# Patient Record
Sex: FEMALE | Race: WHITE | NOT HISPANIC OR LATINO | Employment: UNEMPLOYED | URBAN - NONMETROPOLITAN AREA
[De-identification: names, ages, dates, MRNs, and addresses within clinical notes are randomized per-mention and may not be internally consistent; named-entity substitution may affect disease eponyms.]

---

## 2023-03-27 DIAGNOSIS — E03.9 HYPOTHYROIDISM, UNSPECIFIED TYPE: ICD-10-CM

## 2023-03-27 DIAGNOSIS — E78.5 HYPERLIPIDEMIA, UNSPECIFIED HYPERLIPIDEMIA TYPE: ICD-10-CM

## 2023-03-27 DIAGNOSIS — I10 BENIGN ESSENTIAL HYPERTENSION: ICD-10-CM

## 2023-03-27 RX ORDER — AMLODIPINE BESYLATE 5 MG/1
1 TABLET ORAL DAILY
COMMUNITY
Start: 2019-04-02 | End: 2023-03-27 | Stop reason: SDUPTHER

## 2023-03-27 RX ORDER — AMLODIPINE BESYLATE 5 MG/1
5 TABLET ORAL DAILY
Qty: 90 TABLET | Refills: 1 | Status: SHIPPED | OUTPATIENT
Start: 2023-03-27 | End: 2023-05-01 | Stop reason: SDUPTHER

## 2023-03-27 RX ORDER — LOSARTAN POTASSIUM 100 MG/1
100 TABLET ORAL DAILY
Qty: 90 TABLET | Refills: 1 | Status: SHIPPED | OUTPATIENT
Start: 2023-03-27 | End: 2023-05-01 | Stop reason: SDUPTHER

## 2023-03-27 RX ORDER — PRAVASTATIN SODIUM 40 MG/1
40 TABLET ORAL DAILY
Qty: 90 TABLET | Refills: 1 | Status: SHIPPED | OUTPATIENT
Start: 2023-03-27 | End: 2023-05-01 | Stop reason: SDUPTHER

## 2023-03-27 RX ORDER — LEVOTHYROXINE SODIUM 25 UG/1
25 TABLET ORAL
COMMUNITY
Start: 2021-01-11 | End: 2023-03-27 | Stop reason: SDUPTHER

## 2023-03-27 RX ORDER — LEVOTHYROXINE SODIUM 25 UG/1
25 TABLET ORAL
Qty: 90 TABLET | Refills: 1 | Status: SHIPPED | OUTPATIENT
Start: 2023-03-27 | End: 2023-05-01 | Stop reason: SDUPTHER

## 2023-03-27 RX ORDER — LOSARTAN POTASSIUM 100 MG/1
1 TABLET ORAL DAILY
COMMUNITY
Start: 2019-12-23 | End: 2023-03-27 | Stop reason: SDUPTHER

## 2023-03-27 RX ORDER — PRAVASTATIN SODIUM 40 MG/1
1 TABLET ORAL DAILY
COMMUNITY
Start: 2014-09-26 | End: 2023-03-27 | Stop reason: SDUPTHER

## 2023-04-27 PROBLEM — N39.0 ACUTE LOWER URINARY TRACT INFECTION: Status: ACTIVE | Noted: 2023-04-27

## 2023-04-27 PROBLEM — R73.9 ELEVATED BLOOD SUGAR: Status: ACTIVE | Noted: 2023-04-27

## 2023-04-27 PROBLEM — R31.21 ASYMPTOMATIC MICROSCOPIC HEMATURIA: Status: ACTIVE | Noted: 2023-04-27

## 2023-04-27 PROBLEM — M89.9 BONE LESION: Status: ACTIVE | Noted: 2023-04-27

## 2023-04-27 PROBLEM — E55.9 VITAMIN D DEFICIENCY: Status: ACTIVE | Noted: 2023-04-27

## 2023-04-27 PROBLEM — F32.0 CURRENT MILD EPISODE OF MAJOR DEPRESSIVE DISORDER (CMS-HCC): Status: ACTIVE | Noted: 2023-04-27

## 2023-04-27 PROBLEM — M85.80 OSTEOPENIA: Status: ACTIVE | Noted: 2023-04-27

## 2023-04-27 PROBLEM — R45.86 MOOD SWINGS: Status: ACTIVE | Noted: 2023-04-27

## 2023-04-27 PROBLEM — M54.9 CHRONIC BACK PAIN: Status: ACTIVE | Noted: 2023-04-27

## 2023-04-27 PROBLEM — R80.9 PROTEINURIA: Status: ACTIVE | Noted: 2023-04-27

## 2023-04-27 PROBLEM — R20.0 NUMBNESS: Status: ACTIVE | Noted: 2023-04-27

## 2023-04-27 PROBLEM — L98.9 SKIN LESION: Status: ACTIVE | Noted: 2023-04-27

## 2023-04-27 PROBLEM — M79.605 PAIN OF LEFT LOWER EXTREMITY: Status: ACTIVE | Noted: 2023-04-27

## 2023-04-27 PROBLEM — C44.91 BASAL CELL CARCINOMA: Status: ACTIVE | Noted: 2023-04-27

## 2023-04-27 PROBLEM — M79.604 PAIN OF RIGHT LOWER EXTREMITY: Status: ACTIVE | Noted: 2023-04-27

## 2023-04-27 PROBLEM — R39.9 UTI SYMPTOMS: Status: ACTIVE | Noted: 2023-04-27

## 2023-04-27 PROBLEM — G89.29 CHRONIC BACK PAIN: Status: ACTIVE | Noted: 2023-04-27

## 2023-04-27 PROBLEM — M19.90 ARTHRITIS: Status: ACTIVE | Noted: 2023-04-27

## 2023-04-27 RX ORDER — HYDROCHLOROTHIAZIDE 25 MG/1
TABLET ORAL
COMMUNITY
End: 2023-05-01 | Stop reason: SDUPTHER

## 2023-05-01 ENCOUNTER — OFFICE VISIT (OUTPATIENT)
Dept: PRIMARY CARE | Facility: CLINIC | Age: 78
End: 2023-05-01
Payer: MEDICARE

## 2023-05-01 VITALS
BODY MASS INDEX: 31.8 KG/M2 | DIASTOLIC BLOOD PRESSURE: 80 MMHG | HEIGHT: 63 IN | HEART RATE: 89 BPM | OXYGEN SATURATION: 93 % | SYSTOLIC BLOOD PRESSURE: 130 MMHG | TEMPERATURE: 97.8 F | WEIGHT: 179.5 LBS

## 2023-05-01 DIAGNOSIS — E03.9 HYPOTHYROIDISM, UNSPECIFIED TYPE: ICD-10-CM

## 2023-05-01 DIAGNOSIS — Z13.31 DEPRESSION SCREENING: ICD-10-CM

## 2023-05-01 DIAGNOSIS — E78.5 HYPERLIPIDEMIA, UNSPECIFIED HYPERLIPIDEMIA TYPE: ICD-10-CM

## 2023-05-01 DIAGNOSIS — E66.9 CLASS 1 OBESITY WITH SERIOUS COMORBIDITY AND BODY MASS INDEX (BMI) OF 32.0 TO 32.9 IN ADULT, UNSPECIFIED OBESITY TYPE: ICD-10-CM

## 2023-05-01 DIAGNOSIS — E55.9 VITAMIN D DEFICIENCY: ICD-10-CM

## 2023-05-01 DIAGNOSIS — Z00.00 ROUTINE GENERAL MEDICAL EXAMINATION AT HEALTH CARE FACILITY: Primary | ICD-10-CM

## 2023-05-01 DIAGNOSIS — I10 BENIGN ESSENTIAL HYPERTENSION: ICD-10-CM

## 2023-05-01 PROBLEM — E66.811 CLASS 1 OBESITY WITH SERIOUS COMORBIDITY AND BODY MASS INDEX (BMI) OF 32.0 TO 32.9 IN ADULT: Status: ACTIVE | Noted: 2023-05-01

## 2023-05-01 PROCEDURE — G0447 BEHAVIOR COUNSEL OBESITY 15M: HCPCS | Performed by: NURSE PRACTITIONER

## 2023-05-01 PROCEDURE — 1170F FXNL STATUS ASSESSED: CPT | Performed by: NURSE PRACTITIONER

## 2023-05-01 PROCEDURE — 3075F SYST BP GE 130 - 139MM HG: CPT | Performed by: NURSE PRACTITIONER

## 2023-05-01 PROCEDURE — 99214 OFFICE O/P EST MOD 30 MIN: CPT | Performed by: NURSE PRACTITIONER

## 2023-05-01 PROCEDURE — 1036F TOBACCO NON-USER: CPT | Performed by: NURSE PRACTITIONER

## 2023-05-01 PROCEDURE — 1159F MED LIST DOCD IN RCRD: CPT | Performed by: NURSE PRACTITIONER

## 2023-05-01 PROCEDURE — 1160F RVW MEDS BY RX/DR IN RCRD: CPT | Performed by: NURSE PRACTITIONER

## 2023-05-01 PROCEDURE — 3079F DIAST BP 80-89 MM HG: CPT | Performed by: NURSE PRACTITIONER

## 2023-05-01 PROCEDURE — 1123F ACP DISCUSS/DSCN MKR DOCD: CPT | Performed by: NURSE PRACTITIONER

## 2023-05-01 PROCEDURE — G0439 PPPS, SUBSEQ VISIT: HCPCS | Performed by: NURSE PRACTITIONER

## 2023-05-01 PROCEDURE — G0444 DEPRESSION SCREEN ANNUAL: HCPCS | Performed by: NURSE PRACTITIONER

## 2023-05-01 RX ORDER — HYDROCHLOROTHIAZIDE 25 MG/1
TABLET ORAL
Qty: 90 TABLET | Refills: 0 | Status: SHIPPED | OUTPATIENT
Start: 2023-05-01 | End: 2023-11-01 | Stop reason: ALTCHOICE

## 2023-05-01 RX ORDER — PRAVASTATIN SODIUM 40 MG/1
40 TABLET ORAL DAILY
Qty: 90 TABLET | Refills: 0 | Status: SHIPPED | OUTPATIENT
Start: 2023-05-01 | End: 2023-09-21

## 2023-05-01 RX ORDER — AMLODIPINE BESYLATE 5 MG/1
5 TABLET ORAL DAILY
Qty: 90 TABLET | Refills: 0 | Status: SHIPPED | OUTPATIENT
Start: 2023-05-01 | End: 2023-09-21

## 2023-05-01 RX ORDER — LOSARTAN POTASSIUM 100 MG/1
100 TABLET ORAL DAILY
Qty: 90 TABLET | Refills: 0 | Status: SHIPPED | OUTPATIENT
Start: 2023-05-01 | End: 2023-09-21

## 2023-05-01 RX ORDER — LEVOTHYROXINE SODIUM 25 UG/1
25 TABLET ORAL
Qty: 90 TABLET | Refills: 0 | Status: SHIPPED | OUTPATIENT
Start: 2023-05-01 | End: 2023-09-21

## 2023-05-01 ASSESSMENT — ENCOUNTER SYMPTOMS
ALLERGIC/IMMUNOLOGIC NEGATIVE: 1
WHEEZING: 0
LOSS OF SENSATION IN FEET: 1
OCCASIONAL FEELINGS OF UNSTEADINESS: 1
ARTHRALGIAS: 1
ABDOMINAL PAIN: 0
SINUS PAIN: 0
SHORTNESS OF BREATH: 0
SINUS PRESSURE: 0
RHINORRHEA: 0
HYPERTENSION: 1
FEVER: 0
HEMATOLOGIC/LYMPHATIC NEGATIVE: 1
VOMITING: 0
DIZZINESS: 0
COUGH: 0
ABDOMINAL DISTENTION: 0
DIARRHEA: 0
LIGHT-HEADEDNESS: 0
FATIGUE: 0
ACTIVITY CHANGE: 0
DEPRESSION: 0
NAUSEA: 0
PSYCHIATRIC NEGATIVE: 1
ENDOCRINE NEGATIVE: 1
BACK PAIN: 1
EYES NEGATIVE: 1
SORE THROAT: 0
HEADACHES: 0
CHILLS: 0
CHEST TIGHTNESS: 0
PALPITATIONS: 0
CONSTIPATION: 0
NUMBNESS: 0
WEAKNESS: 0

## 2023-05-01 ASSESSMENT — ACTIVITIES OF DAILY LIVING (ADL)
DOING_HOUSEWORK: INDEPENDENT
GROCERY_SHOPPING: INDEPENDENT
MANAGING_FINANCES: INDEPENDENT
DRESSING: INDEPENDENT
BATHING: INDEPENDENT
TAKING_MEDICATION: INDEPENDENT

## 2023-05-01 ASSESSMENT — PATIENT HEALTH QUESTIONNAIRE - PHQ9
2. FEELING DOWN, DEPRESSED OR HOPELESS: NOT AT ALL
SUM OF ALL RESPONSES TO PHQ9 QUESTIONS 1 AND 2: 0
1. LITTLE INTEREST OR PLEASURE IN DOING THINGS: NOT AT ALL

## 2023-05-01 NOTE — PROGRESS NOTES
Subjective   Reason for Visit: Linda Van is an 78 y.o. female here for a Medicare Wellness visit.          Reviewed all medications by prescribing practitioner or clinical pharmacist (such as prescriptions, OTCs, herbal therapies and supplements) and documented in the medical record.    Medicare physical  Hypothyroidism, TSH 2.07, taking levothyroxine 25 mcg daily  Urology, follows with Dr. Ambriz for incontinence  Chronic low back pain finished PT, continues to do exercises at home  R hip arthritis, worsening, discussed referral to ortho, she will go back and see Dr. Sanchez who she has seen in the past.  Recommend pneumonia vaccine and Tdap  I spent greater than 15 minutes face-to-face with individual providing recommendations for nutrition choices and exercise plan to help achieve weight reduction.  I spent 15 minutes obtaining and discussing depression screening using PHQ-2 questions with results documented in the chart.    Preventive:  CRC screen: Declines  Mammogram: 6/2019, declines  DEXA scan: 6/2019, declines  PAP:  Flu vaccine: Declines  COVID vaccine: 2021  Shingrix: 2019/2020  PCV13:  PCV15:  PCV20:  PPSV23:   Tdap: Unknown  CT cardiac scoring:  Lung cancer screening:     Hypertension  This is a chronic problem. The problem has been gradually improving since onset. The problem is controlled. Pertinent negatives include no chest pain, headaches, palpitations or shortness of breath. Agents associated with hypertension include NSAIDs. Risk factors for coronary artery disease include dyslipidemia, obesity and sedentary lifestyle. Past treatments include angiotensin blockers, calcium channel blockers and diuretics. The current treatment provides moderate improvement.   Hyperlipidemia  This is a chronic problem. The problem is controlled. Recent lipid tests were reviewed and are normal. Exacerbating diseases include obesity. Pertinent negatives include no chest pain or shortness of breath. Current  "antihyperlipidemic treatment includes statins. The current treatment provides moderate improvement of lipids. Risk factors for coronary artery disease include dyslipidemia, obesity, hypertension and a sedentary lifestyle.       Patient Care Team:  BRENDON Wagner-CNP, DNP as PCP - General     Review of Systems   Constitutional:  Negative for activity change, chills, fatigue and fever.   HENT:  Negative for congestion, rhinorrhea, sinus pressure, sinus pain and sore throat.    Eyes: Negative.    Respiratory:  Negative for cough, chest tightness, shortness of breath and wheezing.    Cardiovascular:  Negative for chest pain, palpitations and leg swelling.   Gastrointestinal:  Negative for abdominal distention, abdominal pain, constipation, diarrhea, nausea and vomiting.   Endocrine: Negative.    Genitourinary: Negative.    Musculoskeletal:  Positive for arthralgias and back pain.   Skin: Negative.    Allergic/Immunologic: Negative.    Neurological:  Negative for dizziness, syncope, weakness, light-headedness, numbness and headaches.   Hematological: Negative.    Psychiatric/Behavioral: Negative.     All other systems reviewed and are negative.      Objective   Vitals:  /80   Pulse 89   Temp 36.6 °C (97.8 °F)   Ht 1.588 m (5' 2.5\")   Wt 81.4 kg (179 lb 8 oz)   SpO2 93%   BMI 32.31 kg/m²       Physical Exam  Vitals and nursing note reviewed.   Constitutional:       General: She is not in acute distress.     Appearance: Normal appearance. She is obese. She is not ill-appearing.   HENT:      Head: Normocephalic and atraumatic.      Right Ear: Tympanic membrane, ear canal and external ear normal.      Left Ear: Tympanic membrane, ear canal and external ear normal.      Nose: Nose normal.      Mouth/Throat:      Mouth: Mucous membranes are moist.      Pharynx: Oropharynx is clear.   Eyes:      Pupils: Pupils are equal, round, and reactive to light.   Cardiovascular:      Rate and Rhythm: Normal rate and " regular rhythm.      Pulses: Normal pulses.      Heart sounds: Normal heart sounds. No murmur heard.  Pulmonary:      Effort: Pulmonary effort is normal. No respiratory distress.      Breath sounds: Normal breath sounds. No wheezing.   Abdominal:      General: Bowel sounds are normal. There is no distension.      Palpations: Abdomen is soft.      Tenderness: There is no abdominal tenderness.   Musculoskeletal:         General: No tenderness. Normal range of motion.      Cervical back: Normal range of motion and neck supple.      Right lower leg: No edema.      Left lower leg: No edema.   Skin:     General: Skin is warm and dry.      Capillary Refill: Capillary refill takes less than 2 seconds.      Coloration: Skin is not jaundiced.   Neurological:      General: No focal deficit present.      Mental Status: She is alert and oriented to person, place, and time.      Motor: No weakness.   Psychiatric:         Mood and Affect: Mood normal.         Behavior: Behavior normal.         Thought Content: Thought content normal.         Judgment: Judgment normal.         Assessment/Plan   Problem List Items Addressed This Visit          Circulatory    Benign essential hypertension    Current Assessment & Plan     Controlled. Continue current medications.  -Low fat/cholesterol, low sodium, low carbohydrate diet  -Exercise as tolerated 150 minutes/week, increase activity slowly  -Weight loss         Relevant Medications    amLODIPine (Norvasc) 5 mg tablet    hydroCHLOROthiazide (HYDRODiuril) 25 mg tablet    losartan (Cozaar) 100 mg tablet    Other Relevant Orders    Follow Up In Primary Care    CBC and Auto Differential    Comprehensive Metabolic Panel       Endocrine/Metabolic    Hypothyroidism    Current Assessment & Plan     Controlled. Continue current medications.  Monitor TSH         Relevant Medications    levothyroxine (Synthroid, Levoxyl) 25 mcg tablet    Other Relevant Orders    Follow Up In Primary Care    TSH with  reflex to Free T4 if abnormal    Vitamin D deficiency    Relevant Orders    Vitamin D 1,25 Dihydroxy    Class 1 obesity with serious comorbidity and body mass index (BMI) of 32.0 to 32.9 in adult    Current Assessment & Plan     -Avoid sweets and sugary drinks  -Drink plenty of water  -Avoid processed foods and refined foods  -Eat fruits, vegetables, lean protein, whole grains  -Increase your activity level            Other    Hyperlipidemia    Current Assessment & Plan     Controlled. Continue current medications.  -Low fat/low cholesterol diet  -Eat more fresh foods  -Avoid processed/prepackaged/fast foods  -Gradually increase physical activity  -Weight loss         Relevant Medications    pravastatin (Pravachol) 40 mg tablet    Other Relevant Orders    Follow Up In Primary Care    Lipid Panel     Other Visit Diagnoses       Routine general medical examination at health care facility    -  Primary    Depression screening

## 2023-05-01 NOTE — ASSESSMENT & PLAN NOTE
Controlled. Continue current medications.  -Low fat/cholesterol, low sodium, low carbohydrate diet  -Exercise as tolerated 150 minutes/week, increase activity slowly  -Weight loss

## 2023-05-01 NOTE — ASSESSMENT & PLAN NOTE
-Avoid sweets and sugary drinks  -Drink plenty of water  -Avoid processed foods and refined foods  -Eat fruits, vegetables, lean protein, whole grains  -Increase your activity level   Yes - the patient is able to be screened

## 2023-05-01 NOTE — ASSESSMENT & PLAN NOTE
Controlled. Continue current medications.  -Low fat/low cholesterol diet  -Eat more fresh foods  -Avoid processed/prepackaged/fast foods  -Gradually increase physical activity  -Weight loss

## 2023-05-04 ENCOUNTER — LAB (OUTPATIENT)
Dept: LAB | Facility: LAB | Age: 78
End: 2023-05-04
Payer: MEDICARE

## 2023-05-04 DIAGNOSIS — E55.9 VITAMIN D DEFICIENCY: ICD-10-CM

## 2023-05-04 DIAGNOSIS — I10 BENIGN ESSENTIAL HYPERTENSION: ICD-10-CM

## 2023-05-04 DIAGNOSIS — E78.5 HYPERLIPIDEMIA, UNSPECIFIED HYPERLIPIDEMIA TYPE: ICD-10-CM

## 2023-05-04 DIAGNOSIS — E03.9 HYPOTHYROIDISM, UNSPECIFIED TYPE: ICD-10-CM

## 2023-05-04 LAB
ALANINE AMINOTRANSFERASE (SGPT) (U/L) IN SER/PLAS: 11 U/L (ref 7–45)
ALBUMIN (G/DL) IN SER/PLAS: 4.1 G/DL (ref 3.4–5)
ALKALINE PHOSPHATASE (U/L) IN SER/PLAS: 80 U/L (ref 33–136)
ANION GAP IN SER/PLAS: 14 MMOL/L (ref 10–20)
ASPARTATE AMINOTRANSFERASE (SGOT) (U/L) IN SER/PLAS: 15 U/L (ref 9–39)
BASOPHILS (10*3/UL) IN BLOOD BY AUTOMATED COUNT: 0.12 X10E9/L (ref 0–0.1)
BASOPHILS/100 LEUKOCYTES IN BLOOD BY AUTOMATED COUNT: 1.1 % (ref 0–2)
BILIRUBIN TOTAL (MG/DL) IN SER/PLAS: 0.9 MG/DL (ref 0–1.2)
CALCIUM (MG/DL) IN SER/PLAS: 9.4 MG/DL (ref 8.6–10.3)
CARBON DIOXIDE, TOTAL (MMOL/L) IN SER/PLAS: 30 MMOL/L (ref 21–32)
CHLORIDE (MMOL/L) IN SER/PLAS: 102 MMOL/L (ref 98–107)
CHOLESTEROL (MG/DL) IN SER/PLAS: 157 MG/DL (ref 0–199)
CHOLESTEROL IN HDL (MG/DL) IN SER/PLAS: 72 MG/DL
CHOLESTEROL/HDL RATIO: 2.2
CREATININE (MG/DL) IN SER/PLAS: 0.7 MG/DL (ref 0.5–1.05)
EOSINOPHILS (10*3/UL) IN BLOOD BY AUTOMATED COUNT: 0.27 X10E9/L (ref 0–0.4)
EOSINOPHILS/100 LEUKOCYTES IN BLOOD BY AUTOMATED COUNT: 2.4 % (ref 0–6)
ERYTHROCYTE DISTRIBUTION WIDTH (RATIO) BY AUTOMATED COUNT: 13.2 % (ref 11.5–14.5)
ERYTHROCYTE MEAN CORPUSCULAR HEMOGLOBIN CONCENTRATION (G/DL) BY AUTOMATED: 33.3 G/DL (ref 32–36)
ERYTHROCYTE MEAN CORPUSCULAR VOLUME (FL) BY AUTOMATED COUNT: 90 FL (ref 80–100)
ERYTHROCYTES (10*6/UL) IN BLOOD BY AUTOMATED COUNT: 4.65 X10E12/L (ref 4–5.2)
GFR FEMALE: 88 ML/MIN/1.73M2
GLUCOSE (MG/DL) IN SER/PLAS: 105 MG/DL (ref 74–99)
HEMATOCRIT (%) IN BLOOD BY AUTOMATED COUNT: 41.8 % (ref 36–46)
HEMOGLOBIN (G/DL) IN BLOOD: 13.9 G/DL (ref 12–16)
IMMATURE GRANULOCYTES/100 LEUKOCYTES IN BLOOD BY AUTOMATED COUNT: 0.5 % (ref 0–0.9)
LDL: 53 MG/DL (ref 0–99)
LEUKOCYTES (10*3/UL) IN BLOOD BY AUTOMATED COUNT: 11.4 X10E9/L (ref 4.4–11.3)
LYMPHOCYTES (10*3/UL) IN BLOOD BY AUTOMATED COUNT: 2.88 X10E9/L (ref 0.8–3)
LYMPHOCYTES/100 LEUKOCYTES IN BLOOD BY AUTOMATED COUNT: 25.4 % (ref 13–44)
MONOCYTES (10*3/UL) IN BLOOD BY AUTOMATED COUNT: 0.91 X10E9/L (ref 0.05–0.8)
MONOCYTES/100 LEUKOCYTES IN BLOOD BY AUTOMATED COUNT: 8 % (ref 2–10)
NEUTROPHILS (10*3/UL) IN BLOOD BY AUTOMATED COUNT: 7.12 X10E9/L (ref 1.6–5.5)
NEUTROPHILS/100 LEUKOCYTES IN BLOOD BY AUTOMATED COUNT: 62.6 % (ref 40–80)
PLATELETS (10*3/UL) IN BLOOD AUTOMATED COUNT: 303 X10E9/L (ref 150–450)
POTASSIUM (MMOL/L) IN SER/PLAS: 3.6 MMOL/L (ref 3.5–5.3)
PROTEIN TOTAL: 6.7 G/DL (ref 6.4–8.2)
SODIUM (MMOL/L) IN SER/PLAS: 142 MMOL/L (ref 136–145)
THYROTROPIN (MIU/L) IN SER/PLAS BY DETECTION LIMIT <= 0.05 MIU/L: 2.08 MIU/L (ref 0.44–3.98)
TRIGLYCERIDE (MG/DL) IN SER/PLAS: 158 MG/DL (ref 0–149)
UREA NITROGEN (MG/DL) IN SER/PLAS: 17 MG/DL (ref 6–23)
VLDL: 32 MG/DL (ref 0–40)

## 2023-05-04 PROCEDURE — 80053 COMPREHEN METABOLIC PANEL: CPT

## 2023-05-04 PROCEDURE — 85025 COMPLETE CBC W/AUTO DIFF WBC: CPT

## 2023-05-04 PROCEDURE — 82652 VIT D 1 25-DIHYDROXY: CPT

## 2023-05-04 PROCEDURE — 80061 LIPID PANEL: CPT

## 2023-05-04 PROCEDURE — 84443 ASSAY THYROID STIM HORMONE: CPT

## 2023-05-04 PROCEDURE — 36415 COLL VENOUS BLD VENIPUNCTURE: CPT

## 2023-05-08 LAB — VITAMIN D 1,25-DIHYDROXY: 42.8 PG/ML (ref 19.9–79.3)

## 2023-07-17 ENCOUNTER — TELEPHONE (OUTPATIENT)
Dept: PRIMARY CARE | Facility: CLINIC | Age: 78
End: 2023-07-17
Payer: MEDICARE

## 2023-07-17 ENCOUNTER — PATIENT OUTREACH (OUTPATIENT)
Dept: PRIMARY CARE | Facility: CLINIC | Age: 78
End: 2023-07-17
Payer: MEDICARE

## 2023-07-17 DIAGNOSIS — E87.6 HYPOKALEMIA: ICD-10-CM

## 2023-07-17 DIAGNOSIS — I48.0 PAROXYSMAL ATRIAL FIBRILLATION WITH RVR (MULTI): ICD-10-CM

## 2023-07-17 NOTE — TELEPHONE ENCOUNTER
Transition of Care    Inpatient facility: [facility]  Discharge diagnosis: A-Fib  Discharged to: Home  Discharge date: 07/14/2023  Initial Call date: 07/17/2023 care management tried calling twice, office called and spoke with patient  Spoke with patient/caregiver: patient   Left Message on date : na                                                                  Left Message on date : na  Do you need assistance  visits prior to your PCP visit: No  Home health care ordered: No  Have you been contacted by home care and have a start of care date: No  Are you taking medications as prescribed at discharge: Yes  If not taking medication as prescribed refer to [facility] Pharmacist: na  Referral to [facility] Pharmacist: No  Patient advised to bring all medications to PCP follow-up appointment.  Patient advised to follow discharge instructions until provider follow-up.  TCM visit date: 07/19/2023  TCM provider visit with: Екатерина Topete

## 2023-07-17 NOTE — PROGRESS NOTES
Discharge Facility:[facility]  Discharge Diagnosis:Hypokalemia, paroxysmal atrial fibrillation with RVR  Admission Date:7/14/23  Discharge Date: 7/14/23    PCP Appointment Date:patient needs hospital discharge appt needed  Specialist Appointment Date:   Hospital Encounter and Summary: Linked   See discharge assessment below for further details

## 2023-07-19 ENCOUNTER — LAB (OUTPATIENT)
Dept: LAB | Facility: LAB | Age: 78
End: 2023-07-19
Payer: MEDICARE

## 2023-07-19 ENCOUNTER — OFFICE VISIT (OUTPATIENT)
Dept: PRIMARY CARE | Facility: CLINIC | Age: 78
End: 2023-07-19
Payer: MEDICARE

## 2023-07-19 VITALS
SYSTOLIC BLOOD PRESSURE: 140 MMHG | DIASTOLIC BLOOD PRESSURE: 80 MMHG | BODY MASS INDEX: 31.88 KG/M2 | TEMPERATURE: 96.3 F | OXYGEN SATURATION: 98 % | WEIGHT: 177.1 LBS | HEART RATE: 65 BPM

## 2023-07-19 DIAGNOSIS — I48.91 ATRIAL FIBRILLATION, UNSPECIFIED TYPE (MULTI): Primary | ICD-10-CM

## 2023-07-19 DIAGNOSIS — I10 BENIGN ESSENTIAL HYPERTENSION: ICD-10-CM

## 2023-07-19 DIAGNOSIS — I48.91 ATRIAL FIBRILLATION, UNSPECIFIED TYPE (MULTI): ICD-10-CM

## 2023-07-19 DIAGNOSIS — I48.91 ATRIAL FIBRILLATION AND FLUTTER (MULTI): ICD-10-CM

## 2023-07-19 DIAGNOSIS — I48.92 ATRIAL FIBRILLATION AND FLUTTER (MULTI): ICD-10-CM

## 2023-07-19 DIAGNOSIS — Z09 HOSPITAL DISCHARGE FOLLOW-UP: ICD-10-CM

## 2023-07-19 DIAGNOSIS — E66.9 CLASS 1 OBESITY WITH SERIOUS COMORBIDITY AND BODY MASS INDEX (BMI) OF 31.0 TO 31.9 IN ADULT, UNSPECIFIED OBESITY TYPE: ICD-10-CM

## 2023-07-19 DIAGNOSIS — E78.2 MIXED HYPERLIPIDEMIA: ICD-10-CM

## 2023-07-19 LAB
ANION GAP IN SER/PLAS: 12 MMOL/L (ref 10–20)
CALCIUM (MG/DL) IN SER/PLAS: 9.6 MG/DL (ref 8.6–10.3)
CARBON DIOXIDE, TOTAL (MMOL/L) IN SER/PLAS: 29 MMOL/L (ref 21–32)
CHLORIDE (MMOL/L) IN SER/PLAS: 102 MMOL/L (ref 98–107)
CREATININE (MG/DL) IN SER/PLAS: 0.67 MG/DL (ref 0.5–1.05)
GFR FEMALE: 89 ML/MIN/1.73M2
GLUCOSE (MG/DL) IN SER/PLAS: 90 MG/DL (ref 74–99)
MAGNESIUM (MG/DL) IN SER/PLAS: 2.01 MG/DL (ref 1.6–2.4)
POTASSIUM (MMOL/L) IN SER/PLAS: 3.9 MMOL/L (ref 3.5–5.3)
SODIUM (MMOL/L) IN SER/PLAS: 139 MMOL/L (ref 136–145)
UREA NITROGEN (MG/DL) IN SER/PLAS: 14 MG/DL (ref 6–23)

## 2023-07-19 PROCEDURE — 36415 COLL VENOUS BLD VENIPUNCTURE: CPT

## 2023-07-19 PROCEDURE — 3077F SYST BP >= 140 MM HG: CPT | Performed by: NURSE PRACTITIONER

## 2023-07-19 PROCEDURE — 1159F MED LIST DOCD IN RCRD: CPT | Performed by: NURSE PRACTITIONER

## 2023-07-19 PROCEDURE — 80048 BASIC METABOLIC PNL TOTAL CA: CPT

## 2023-07-19 PROCEDURE — 1036F TOBACCO NON-USER: CPT | Performed by: NURSE PRACTITIONER

## 2023-07-19 PROCEDURE — 1160F RVW MEDS BY RX/DR IN RCRD: CPT | Performed by: NURSE PRACTITIONER

## 2023-07-19 PROCEDURE — 3079F DIAST BP 80-89 MM HG: CPT | Performed by: NURSE PRACTITIONER

## 2023-07-19 PROCEDURE — 99496 TRANSJ CARE MGMT HIGH F2F 7D: CPT | Performed by: NURSE PRACTITIONER

## 2023-07-19 PROCEDURE — 83735 ASSAY OF MAGNESIUM: CPT

## 2023-07-19 RX ORDER — METOPROLOL TARTRATE 50 MG/1
50 TABLET ORAL 2 TIMES DAILY
COMMUNITY

## 2023-07-19 ASSESSMENT — ENCOUNTER SYMPTOMS
COUGH: 0
NUMBNESS: 0
FATIGUE: 0
SINUS PRESSURE: 0
DIARRHEA: 0
CHILLS: 0
ARTHRALGIAS: 1
HEMATOLOGIC/LYMPHATIC NEGATIVE: 1
CONSTIPATION: 0
ALLERGIC/IMMUNOLOGIC NEGATIVE: 1
DIZZINESS: 0
PSYCHIATRIC NEGATIVE: 1
ACTIVITY CHANGE: 0
ABDOMINAL PAIN: 0
RHINORRHEA: 0
ABDOMINAL DISTENTION: 0
SHORTNESS OF BREATH: 0
PALPITATIONS: 0
BACK PAIN: 1
LIGHT-HEADEDNESS: 0
VOMITING: 0
SORE THROAT: 0
HEADACHES: 0
EYES NEGATIVE: 1
CHEST TIGHTNESS: 0
WHEEZING: 0
NAUSEA: 0
FEVER: 0
WEAKNESS: 0
SINUS PAIN: 0
ENDOCRINE NEGATIVE: 1

## 2023-07-19 NOTE — PROGRESS NOTES
"Patient: Linda Van  : 1945  PCP: Екатерина Topete, BRENDON-CNP, SANDY  MRN: [id number]  Program: No linked episodes     Linda Van is a 78 y.o. female presenting today for follow-up after being discharged from the hospital 5 days ago. The main problem requiring admission was new onset PAF, hypokalemia. The discharge summary and/or Transitional Care Management documentation was reviewed. Medication reconciliation was performed as indicated via the \"Momo as Reviewed\" timestamp.     Linda Van was contacted by Transitional Care Management services two days after her discharge. This encounter and supporting documentation was reviewed.    The complexity of medical decision making for this patient's transitional care is high.    On , patient went to bed feeling fine but shortly after began feeling weak, \"not right.\" Her daughters took her to the emergency room, she was found to be in new onset PAF with potassium 2.9. Work-up was negative for PE, ACS. Potassium was repleted. Cardiology was consulted. Patient was discharged to have Zio patch placed and follow-up as outpatient. Day following discharge same scenario happened, she went to bed feeling fine but then started having symptoms again. She returned to the emergency room was found to have low potassium again. Potassium was repleted. Started on metoprolol 50 mg 2 x daily. We will check potassium today. Stop HCTZ. Per patient request, referral given for Dr. Stinson. Discussed diet high in potassium and magnesium. Patient feeling well today.     Physical Exam  Vitals and nursing note reviewed.   Constitutional:       General: She is not in acute distress.     Appearance: Normal appearance. She is obese. She is not ill-appearing.   HENT:      Head: Normocephalic and atraumatic.      Right Ear: Tympanic membrane, ear canal and external ear normal.      Left Ear: Tympanic membrane, ear canal and external ear normal.      Nose: Nose normal.      " Mouth/Throat:      Mouth: Mucous membranes are moist.      Pharynx: Oropharynx is clear.   Eyes:      Pupils: Pupils are equal, round, and reactive to light.   Cardiovascular:      Rate and Rhythm: Normal rate and regular rhythm.      Pulses: Normal pulses.      Heart sounds: Normal heart sounds. No murmur heard.  Pulmonary:      Effort: Pulmonary effort is normal. No respiratory distress.      Breath sounds: Normal breath sounds. No wheezing.   Abdominal:      General: Bowel sounds are normal. There is no distension.      Palpations: Abdomen is soft.      Tenderness: There is no abdominal tenderness.   Musculoskeletal:         General: No tenderness. Normal range of motion.      Cervical back: Normal range of motion and neck supple.      Right lower leg: No edema.      Left lower leg: No edema.   Skin:     General: Skin is warm and dry.      Capillary Refill: Capillary refill takes less than 2 seconds.      Coloration: Skin is not jaundiced.   Neurological:      General: No focal deficit present.      Mental Status: She is alert and oriented to person, place, and time.      Motor: No weakness.   Psychiatric:         Mood and Affect: Mood normal.         Behavior: Behavior normal.         Thought Content: Thought content normal.         Judgment: Judgment normal.       Assessment/Plan     #New onset PAF  -Referral to Dr. Stinson  -Monitor electrolytes, stop HCTZ  -Continue metoprolol 50 mg twice a day  -Continue Eliquis 5 mg every 12 hours  #Hypertension, controlled  -Continue amlodipine 5 mg daily, losartan 100 mg daily, metoprolol 50 mg twice a day  -Low fat/cholesterol, low sodium, low carbohydrate diet  -Exercise as tolerated 150 minutes/week, increase activity slowly  -Weight loss  #Hyperlipidemia  -Continue pravastatin 40 mg daily  -Low fat/low cholesterol diet  -Eat more fresh foods  -Avoid processed/prepackaged/fast foods  -Gradually increase physical activity  -Weight loss  #Hypothyroidism,  controlled  -Continue levothyroxine 25 mg daily  -Monitor TSH  #Obesity, BMI 31  -Avoid sweets and sugary drinks  -Drink plenty of water  -Avoid processed foods and refined foods  -Eat fruits, vegetables, lean protein, whole grains  -Increase your activity level    Follow-up in 3 months and as needed    Review of Systems   Constitutional:  Negative for activity change, chills, fatigue and fever.   HENT:  Negative for congestion, rhinorrhea, sinus pressure, sinus pain and sore throat.    Eyes: Negative.    Respiratory:  Negative for cough, chest tightness, shortness of breath and wheezing.    Cardiovascular:  Negative for chest pain, palpitations and leg swelling.   Gastrointestinal:  Negative for abdominal distention, abdominal pain, constipation, diarrhea, nausea and vomiting.   Endocrine: Negative.    Genitourinary: Negative.    Musculoskeletal:  Positive for arthralgias and back pain.   Skin: Negative.    Allergic/Immunologic: Negative.    Neurological:  Negative for dizziness, syncope, weakness, light-headedness, numbness and headaches.   Hematological: Negative.    Psychiatric/Behavioral: Negative.     All other systems reviewed and are negative.      No family history on file.    No data recorded    No follow-ups on file.    Inpatient facility: [facility]  Discharge diagnosis: A-Fib  Discharged to: Home  Discharge date: 07/14/2023  Initial Call date: 07/17/2023 care management tried calling twice, office called and spoke with patient  Spoke with patient/caregiver: patient   Left Message on date : na                                                                  Left Message on date : na  Do you need assistance  visits prior to your PCP visit: No  Home health care ordered: No  Have you been contacted by home care and have a start of care date: No  Are you taking medications as prescribed at discharge: Yes  If not taking medication as prescribed refer to [facility] Pharmacist: zulema  Referral to [facility]  Pharmacist: No  Patient advised to bring all medications to PCP follow-up appointment.  Patient advised to follow discharge instructions until provider follow-up.  TCM visit date: 07/19/2023  TCM provider visit with: Екатерина Topete

## 2023-07-20 DIAGNOSIS — E87.6 HYPOKALEMIA: Primary | ICD-10-CM

## 2023-07-23 PROBLEM — E66.811 CLASS 1 OBESITY WITH SERIOUS COMORBIDITY AND BODY MASS INDEX (BMI) OF 32.0 TO 32.9 IN ADULT: Status: RESOLVED | Noted: 2023-05-01 | Resolved: 2023-07-23

## 2023-07-23 PROBLEM — R39.9 UTI SYMPTOMS: Status: RESOLVED | Noted: 2023-04-27 | Resolved: 2023-07-23

## 2023-07-23 PROBLEM — E66.9 CLASS 1 OBESITY WITH SERIOUS COMORBIDITY AND BODY MASS INDEX (BMI) OF 32.0 TO 32.9 IN ADULT: Status: RESOLVED | Noted: 2023-05-01 | Resolved: 2023-07-23

## 2023-08-04 ENCOUNTER — LAB (OUTPATIENT)
Dept: LAB | Facility: LAB | Age: 78
End: 2023-08-04
Payer: MEDICARE

## 2023-08-04 DIAGNOSIS — E87.6 HYPOKALEMIA: ICD-10-CM

## 2023-08-04 LAB — POTASSIUM (MMOL/L) IN SER/PLAS: 4.1 MMOL/L (ref 3.5–5.3)

## 2023-08-04 PROCEDURE — 84132 ASSAY OF SERUM POTASSIUM: CPT

## 2023-08-04 PROCEDURE — 36415 COLL VENOUS BLD VENIPUNCTURE: CPT

## 2023-08-05 DIAGNOSIS — I10 BENIGN ESSENTIAL HYPERTENSION: Primary | ICD-10-CM

## 2023-08-05 DIAGNOSIS — E87.6 HYPOKALEMIA: ICD-10-CM

## 2023-08-21 ENCOUNTER — LAB (OUTPATIENT)
Dept: LAB | Facility: LAB | Age: 78
End: 2023-08-21
Payer: MEDICARE

## 2023-08-21 DIAGNOSIS — E87.6 HYPOKALEMIA: ICD-10-CM

## 2023-08-21 DIAGNOSIS — I10 BENIGN ESSENTIAL HYPERTENSION: ICD-10-CM

## 2023-08-21 LAB
ANION GAP IN SER/PLAS: 10 MMOL/L (ref 10–20)
CALCIUM (MG/DL) IN SER/PLAS: 8.9 MG/DL (ref 8.6–10.3)
CARBON DIOXIDE, TOTAL (MMOL/L) IN SER/PLAS: 28 MMOL/L (ref 21–32)
CHLORIDE (MMOL/L) IN SER/PLAS: 107 MMOL/L (ref 98–107)
CREATININE (MG/DL) IN SER/PLAS: 0.74 MG/DL (ref 0.5–1.05)
GFR FEMALE: 82 ML/MIN/1.73M2
GLUCOSE (MG/DL) IN SER/PLAS: 100 MG/DL (ref 74–99)
POTASSIUM (MMOL/L) IN SER/PLAS: 4.1 MMOL/L (ref 3.5–5.3)
SODIUM (MMOL/L) IN SER/PLAS: 141 MMOL/L (ref 136–145)
UREA NITROGEN (MG/DL) IN SER/PLAS: 9 MG/DL (ref 6–23)

## 2023-08-21 PROCEDURE — 80048 BASIC METABOLIC PNL TOTAL CA: CPT

## 2023-08-21 PROCEDURE — 36415 COLL VENOUS BLD VENIPUNCTURE: CPT

## 2023-09-21 DIAGNOSIS — E03.9 HYPOTHYROIDISM, UNSPECIFIED TYPE: ICD-10-CM

## 2023-09-21 DIAGNOSIS — E78.5 HYPERLIPIDEMIA, UNSPECIFIED HYPERLIPIDEMIA TYPE: ICD-10-CM

## 2023-09-21 DIAGNOSIS — I10 BENIGN ESSENTIAL HYPERTENSION: ICD-10-CM

## 2023-09-21 RX ORDER — LOSARTAN POTASSIUM 100 MG/1
100 TABLET ORAL DAILY
Qty: 90 TABLET | Refills: 0 | Status: SHIPPED | OUTPATIENT
Start: 2023-09-21 | End: 2023-12-18 | Stop reason: SDUPTHER

## 2023-09-21 RX ORDER — PRAVASTATIN SODIUM 40 MG/1
40 TABLET ORAL DAILY
Qty: 90 TABLET | Refills: 0 | Status: SHIPPED | OUTPATIENT
Start: 2023-09-21 | End: 2023-12-18 | Stop reason: SDUPTHER

## 2023-09-21 RX ORDER — LEVOTHYROXINE SODIUM 25 UG/1
TABLET ORAL
Qty: 90 TABLET | Refills: 0 | Status: SHIPPED | OUTPATIENT
Start: 2023-09-21 | End: 2023-12-18 | Stop reason: SDUPTHER

## 2023-09-21 RX ORDER — AMLODIPINE BESYLATE 5 MG/1
5 TABLET ORAL DAILY
Qty: 90 TABLET | Refills: 0 | Status: SHIPPED | OUTPATIENT
Start: 2023-09-21 | End: 2023-12-18 | Stop reason: SDUPTHER

## 2023-10-17 ENCOUNTER — PATIENT OUTREACH (OUTPATIENT)
Dept: PRIMARY CARE | Facility: CLINIC | Age: 78
End: 2023-10-17
Payer: MEDICARE

## 2023-11-01 ENCOUNTER — LAB (OUTPATIENT)
Dept: LAB | Facility: LAB | Age: 78
End: 2023-11-01
Payer: MEDICARE

## 2023-11-01 ENCOUNTER — OFFICE VISIT (OUTPATIENT)
Dept: PRIMARY CARE | Facility: CLINIC | Age: 78
End: 2023-11-01
Payer: MEDICARE

## 2023-11-01 VITALS
BODY MASS INDEX: 31.97 KG/M2 | TEMPERATURE: 96.7 F | SYSTOLIC BLOOD PRESSURE: 128 MMHG | WEIGHT: 174.6 LBS | DIASTOLIC BLOOD PRESSURE: 60 MMHG | HEART RATE: 52 BPM | OXYGEN SATURATION: 98 %

## 2023-11-01 DIAGNOSIS — E66.9 CLASS 1 OBESITY WITH SERIOUS COMORBIDITY AND BODY MASS INDEX (BMI) OF 31.0 TO 31.9 IN ADULT, UNSPECIFIED OBESITY TYPE: ICD-10-CM

## 2023-11-01 DIAGNOSIS — I48.92 ATRIAL FIBRILLATION AND FLUTTER (MULTI): ICD-10-CM

## 2023-11-01 DIAGNOSIS — E03.9 HYPOTHYROIDISM, UNSPECIFIED TYPE: ICD-10-CM

## 2023-11-01 DIAGNOSIS — I10 BENIGN ESSENTIAL HYPERTENSION: Primary | ICD-10-CM

## 2023-11-01 DIAGNOSIS — I48.91 ATRIAL FIBRILLATION, UNSPECIFIED TYPE (MULTI): ICD-10-CM

## 2023-11-01 DIAGNOSIS — E87.6 HYPOKALEMIA: ICD-10-CM

## 2023-11-01 DIAGNOSIS — M19.90 ARTHRITIS: ICD-10-CM

## 2023-11-01 DIAGNOSIS — I48.91 ATRIAL FIBRILLATION AND FLUTTER (MULTI): ICD-10-CM

## 2023-11-01 DIAGNOSIS — E78.5 HYPERLIPIDEMIA, UNSPECIFIED HYPERLIPIDEMIA TYPE: ICD-10-CM

## 2023-11-01 PROBLEM — F32.0 CURRENT MILD EPISODE OF MAJOR DEPRESSIVE DISORDER (CMS-HCC): Status: RESOLVED | Noted: 2023-04-27 | Resolved: 2023-11-01

## 2023-11-01 PROBLEM — N39.0 ACUTE LOWER URINARY TRACT INFECTION: Status: RESOLVED | Noted: 2023-04-27 | Resolved: 2023-11-01

## 2023-11-01 LAB
ANION GAP SERPL CALC-SCNC: 11 MMOL/L (ref 10–20)
BUN SERPL-MCNC: 13 MG/DL (ref 6–23)
CALCIUM SERPL-MCNC: 9.3 MG/DL (ref 8.6–10.3)
CHLORIDE SERPL-SCNC: 105 MMOL/L (ref 98–107)
CO2 SERPL-SCNC: 28 MMOL/L (ref 21–32)
CREAT SERPL-MCNC: 0.7 MG/DL (ref 0.5–1.05)
GFR SERPL CREATININE-BSD FRML MDRD: 89 ML/MIN/1.73M*2
GLUCOSE SERPL-MCNC: 101 MG/DL (ref 74–99)
MAGNESIUM SERPL-MCNC: 2.06 MG/DL (ref 1.6–2.4)
POTASSIUM SERPL-SCNC: 4.4 MMOL/L (ref 3.5–5.3)
SODIUM SERPL-SCNC: 140 MMOL/L (ref 136–145)

## 2023-11-01 PROCEDURE — 3078F DIAST BP <80 MM HG: CPT | Performed by: NURSE PRACTITIONER

## 2023-11-01 PROCEDURE — 3074F SYST BP LT 130 MM HG: CPT | Performed by: NURSE PRACTITIONER

## 2023-11-01 PROCEDURE — 1159F MED LIST DOCD IN RCRD: CPT | Performed by: NURSE PRACTITIONER

## 2023-11-01 PROCEDURE — 1160F RVW MEDS BY RX/DR IN RCRD: CPT | Performed by: NURSE PRACTITIONER

## 2023-11-01 PROCEDURE — 36415 COLL VENOUS BLD VENIPUNCTURE: CPT

## 2023-11-01 PROCEDURE — 1036F TOBACCO NON-USER: CPT | Performed by: NURSE PRACTITIONER

## 2023-11-01 PROCEDURE — 99214 OFFICE O/P EST MOD 30 MIN: CPT | Performed by: NURSE PRACTITIONER

## 2023-11-01 NOTE — PROGRESS NOTES
"Subjective   Patient ID: Linda Van is a 78 y.o. female who presents for Atrial Fibrillation, Hypertension, Hyperlipidemia, Results, and rt hip surgery (On 23 with Dr. Davis, total hip replacement, has already been cleared by Dr. Stinson).    Right hip arthritis worsening, scheduled for right hip replacement 2023 with Dr. Davis.  Patient has been cleared by cardiology.  PAF, controlled, following with cardiology. Taking metoprolol. Taking Eliquis for stroke prevention.  Needs potassium and magnesium rechecked today.  Hypertension, controlled.  Taking amlodipine 5 mg daily, losartan 100 mg daily and metoprolol 50 mg twice a day.  Hyperlipidemia, controlled, taking pravastatin 40 mg daily.  Hypothyroidism, taking levothyroxine 25 mcg daily  Urology, follows with Dr. Ambriz for incontinence  Chronic low back pain finished PT, continues to do exercises at home  Recommend pneumonia vaccine and Tdap    Preventive:  CRC screen: Declines  Mammogram: 2019, declines  DEXA scan: 2019, declines  PAP:  CT cardiac scorin2023 score 1.91    === 23 ===    CT CARDIAC SCORING WO IV CONTRAST    - Impression -  1. Coronary artery calcium score of 1.91*.    *Coronary artery calcium scoring may be helpful in predicting the  risk for future coronary heart disease events.  According to the  American College [employer] Foundation Clinical Expert Consensus  Task Force, such testing provides important prognostic information in  patients with more than one coronary heart disease risk factor. The  coronary artery calcium score correlates with the annual risk of a  non-fatal myocardial infarction or coronary heart disease death.    Coronary artery score            Annual Risk    0-99                             0.4%  100-399                        1.3%  >400                            2.4%    These three \"breakpoints\" correspond to lower, intermediate and high  risk states for future coronary events.  Such " information should be  used, along with appropriate clinical judgment, to make decisions  regarding the intensity of risk factor management strategies to treat  blood lipids and to modify other non-lipid coronary risk factors.    Reference: Colliers P et al. Circulation.  2007; 115:402-426    MACRO:  None         Review of Systems   Constitutional:  Negative for activity change, chills, fatigue and fever.   HENT:  Negative for congestion, rhinorrhea, sinus pressure, sinus pain and sore throat.    Eyes: Negative.    Respiratory:  Negative for cough, chest tightness, shortness of breath and wheezing.    Cardiovascular:  Negative for chest pain, palpitations and leg swelling.   Gastrointestinal:  Negative for abdominal distention, abdominal pain, constipation, diarrhea, nausea and vomiting.   Endocrine: Negative.    Genitourinary: Negative.    Musculoskeletal:  Positive for arthralgias and back pain.   Skin: Negative.    Allergic/Immunologic: Negative.    Neurological:  Negative for dizziness, syncope, weakness, light-headedness, numbness and headaches.   Hematological: Negative.    Psychiatric/Behavioral: Negative.     All other systems reviewed and are negative.      Objective   /60   Pulse 52   Temp 35.9 °C (96.7 °F)   Wt 79.2 kg (174 lb 9.6 oz)   SpO2 98%   BMI 31.97 kg/m²     Physical Exam  Vitals and nursing note reviewed.   Constitutional:       General: She is not in acute distress.     Appearance: Normal appearance. She is obese. She is not ill-appearing.   HENT:      Head: Normocephalic and atraumatic.      Right Ear: Tympanic membrane, ear canal and external ear normal.      Left Ear: Tympanic membrane, ear canal and external ear normal.      Nose: Nose normal.      Mouth/Throat:      Mouth: Mucous membranes are moist.      Pharynx: Oropharynx is clear.   Eyes:      Pupils: Pupils are equal, round, and reactive to light.   Cardiovascular:      Rate and Rhythm: Normal rate and regular rhythm.       Pulses: Normal pulses.      Heart sounds: Normal heart sounds. No murmur heard.  Pulmonary:      Effort: Pulmonary effort is normal. No respiratory distress.      Breath sounds: Normal breath sounds. No wheezing.   Abdominal:      General: Bowel sounds are normal. There is no distension.      Palpations: Abdomen is soft.      Tenderness: There is no abdominal tenderness.   Musculoskeletal:         General: No tenderness. Normal range of motion.      Cervical back: Normal range of motion and neck supple.      Right lower leg: No edema.      Left lower leg: No edema.   Skin:     General: Skin is warm and dry.      Capillary Refill: Capillary refill takes less than 2 seconds.      Coloration: Skin is not jaundiced.   Neurological:      General: No focal deficit present.      Mental Status: She is alert and oriented to person, place, and time.      Motor: No weakness.   Psychiatric:         Mood and Affect: Mood normal.         Behavior: Behavior normal.         Thought Content: Thought content normal.         Judgment: Judgment normal.         Assessment/Plan     #PAF  -Follow with Dr. Stinson  -Monitor electrolytes  -Continue metoprolol 50 mg twice a day  -Continue Eliquis 5 mg every 12 hours for stroke prevention  #Hypertension, controlled  -Continue amlodipine 5 mg daily, losartan 100 mg daily, metoprolol 50 mg twice a day  -Low fat/cholesterol, low sodium, low carbohydrate diet  -Exercise as tolerated 150 minutes/week, increase activity slowly  -Weight loss  #Hyperlipidemia  -Continue pravastatin 40 mg daily  -Low fat/low cholesterol diet  -Eat more fresh foods  -Avoid processed/prepackaged/fast foods  -Gradually increase physical activity  -Weight loss  #Hypothyroidism, controlled  -Continue levothyroxine 25 mg daily  -Monitor TSH  #Obesity, BMI 31  -Avoid sweets and sugary drinks  -Drink plenty of water  -Avoid processed foods and refined foods  -Eat fruits, vegetables, lean protein, whole grains  -Increase your  activity level  #R hip arthritis  -Scheduled for hip replacement 11/8    Follow-up in 6 months for Medicare physical and as needed

## 2023-11-05 ASSESSMENT — ENCOUNTER SYMPTOMS
WEAKNESS: 0
CHILLS: 0
FEVER: 0
SORE THROAT: 0
LIGHT-HEADEDNESS: 0
RHINORRHEA: 0
EYES NEGATIVE: 1
COUGH: 0
HEMATOLOGIC/LYMPHATIC NEGATIVE: 1
HEADACHES: 0
ACTIVITY CHANGE: 0
SINUS PAIN: 0
ARTHRALGIAS: 1
NUMBNESS: 0
ENDOCRINE NEGATIVE: 1
ABDOMINAL PAIN: 0
ABDOMINAL DISTENTION: 0
CHEST TIGHTNESS: 0
FATIGUE: 0
WHEEZING: 0
SHORTNESS OF BREATH: 0
SINUS PRESSURE: 0
DIARRHEA: 0
NAUSEA: 0
PSYCHIATRIC NEGATIVE: 1
DIZZINESS: 0
PALPITATIONS: 0
VOMITING: 0
ALLERGIC/IMMUNOLOGIC NEGATIVE: 1
BACK PAIN: 1
CONSTIPATION: 0

## 2023-12-18 DIAGNOSIS — E03.9 HYPOTHYROIDISM, UNSPECIFIED TYPE: ICD-10-CM

## 2023-12-18 DIAGNOSIS — E78.5 HYPERLIPIDEMIA, UNSPECIFIED HYPERLIPIDEMIA TYPE: ICD-10-CM

## 2023-12-18 DIAGNOSIS — I10 BENIGN ESSENTIAL HYPERTENSION: ICD-10-CM

## 2023-12-18 RX ORDER — AMLODIPINE BESYLATE 5 MG/1
5 TABLET ORAL DAILY
Qty: 90 TABLET | Refills: 0 | Status: SHIPPED | OUTPATIENT
Start: 2023-12-18 | End: 2024-03-22 | Stop reason: SDUPTHER

## 2023-12-18 RX ORDER — LOSARTAN POTASSIUM 100 MG/1
100 TABLET ORAL DAILY
Qty: 90 TABLET | Refills: 0 | Status: SHIPPED | OUTPATIENT
Start: 2023-12-18 | End: 2024-03-22 | Stop reason: SDUPTHER

## 2023-12-18 RX ORDER — PRAVASTATIN SODIUM 40 MG/1
40 TABLET ORAL DAILY
Qty: 90 TABLET | Refills: 0 | Status: SHIPPED | OUTPATIENT
Start: 2023-12-18 | End: 2024-03-22 | Stop reason: SDUPTHER

## 2023-12-18 RX ORDER — LEVOTHYROXINE SODIUM 25 UG/1
TABLET ORAL
Qty: 90 TABLET | Refills: 0 | Status: SHIPPED | OUTPATIENT
Start: 2023-12-18 | End: 2024-03-22 | Stop reason: SDUPTHER

## 2024-01-05 ENCOUNTER — EVALUATION (OUTPATIENT)
Dept: PHYSICAL THERAPY | Facility: HOSPITAL | Age: 79
End: 2024-01-05
Payer: MEDICARE

## 2024-01-05 DIAGNOSIS — M79.604 PAIN OF RIGHT LOWER EXTREMITY: ICD-10-CM

## 2024-01-05 DIAGNOSIS — M25.551 RIGHT HIP PAIN: Primary | ICD-10-CM

## 2024-01-05 PROCEDURE — 97161 PT EVAL LOW COMPLEX 20 MIN: CPT | Mod: GP | Performed by: PHYSICAL THERAPIST

## 2024-01-05 PROCEDURE — 97110 THERAPEUTIC EXERCISES: CPT | Mod: GP | Performed by: PHYSICAL THERAPIST

## 2024-01-05 ASSESSMENT — ACTIVITIES OF DAILY LIVING (ADL): ADL_ASSISTANCE: INDEPENDENT

## 2024-01-05 ASSESSMENT — ENCOUNTER SYMPTOMS
OCCASIONAL FEELINGS OF UNSTEADINESS: 1
LOSS OF SENSATION IN FEET: 0
DEPRESSION: 0

## 2024-01-05 ASSESSMENT — PAIN - FUNCTIONAL ASSESSMENT: PAIN_FUNCTIONAL_ASSESSMENT: 0-10

## 2024-01-05 ASSESSMENT — PAIN SCALES - GENERAL: PAINLEVEL_OUTOF10: 0 - NO PAIN

## 2024-01-05 NOTE — PROGRESS NOTES
"Physical Therapy    Physical Therapy Evaluation and Treatment      Patient Name: Linda Van  MRN: [id number]  Today's Date: 1/5/2024  Time Calculation  Start Time: 0903  Stop Time: 0943  Time Calculation (min): 40 min    Assessment:  PT Assessment  PT Assessment Results: Decreased strength, Orthopedic restrictions, Impaired balance, Decreased range of motion, Decreased mobility  Rehab Prognosis: Good  Evaluation/Treatment Tolerance: Patient tolerated treatment well   Patient demonstrated impaired strength, balance, and balance. Patient's ability to perform hip abduction is limited by a \"small avulsion of greater troch\". Active right hip abduction to be avoided until 1/16/2024. Skilled physical therapy is warranted to address the above stated impairments, so the patient can perform functional activities and work duties without increased pain or difficulty.    Plan:  OP PT Plan  Treatment/Interventions: Education/ Instruction, Gait training, Manual therapy, Neuromuscular re-education, Therapeutic activities, Therapeutic exercises  PT Plan: Skilled PT  PT Frequency: 2 times per week  Duration: 6 weeks  Onset Date: 11/08/23  Certification Period Start Date: 01/05/24  Certification Period End Date: 03/29/24  Number of Treatments Authorized: 1 of 12  Rehab Potential: Good  Plan of Care Agreement: Patient    Current Problem:   1. Right hip pain  Referral to Physical Therapy    Follow Up In Physical Therapy      2. Pain of right lower extremity            Subjective    General:  General  Reason for Referral: right hip pain s/p GLORY  Past Medical History Relevant to Rehab: Right GLORY with small avulsion of greater trochanter on 11/8/2023  Patient is a 78 year old female presenting status post right total hip arthroplasty on 11/8/2023. Patient had home health physical therapy x2 weeks. Patient continued the home exercise program from the home health physical therapy, but felt like she needed more so she is not attending " "outpatient physical therapy. Patient states that she started using the cane a couple of weeks ago.    Precautions:  Precautions  STEADI Fall Risk Score (The score of 4 or more indicates an increased risk of falling): 5  LE Weight Bearing Status: Weight Bearing as Tolerated  Post-Surgical Precautions: Right hip precautions  Precautions Comment: no active hip abduction x4 weeks (12/19-1/16)    Pain:  Pain Assessment  Pain Assessment: 0-10  Pain Score: 0 - No pain    Home Living:   Patient lives alone in a single level house with 2 stairs to enter from the garage. Patient has a temporary ramp in place and has not been doing the stairs.    Prior Level of Function:  Prior Function Per Pt/Caregiver Report  Level of Bertie: Independent with ADLs and functional transfers, Independent with homemaking with ambulation  ADL Assistance: Independent  Homemaking Assistance: Independent  Ambulatory Assistance: Needs assistance  Transfers: Assistive device  Gait: Assistive device    Objective     Extremity/Trunk Assessments:  Strength RLE  R Hip Flexion: 4-/5  R Knee Flexion: 5/5  R Knee Extension: 5/5  R Ankle Dorsiflexion: 5/5    LLE   LLE :  (ROM not tested 2/2 recent GLORY)  Strength LLE  L Hip Flexion: 4/5  L Knee Flexion: 5/5  L Knee Extension: 5/5      Outcome Measures:  LEFS= did not complete (to fill out next visit)     Treatments:  Therapeutic Exercise  Therapeutic Exercise Performed: Yes  Therapeutic Exercise Activity 1: LAQ x20  Therapeutic Exercise Activity 2: STS x10 @21\" height  Therapeutic Exercise Activity 3: standing hip flex x20  Therapeutic Exercise Activity 4: heel raises x20  Therapeutic Exercise Activity 5: step ups 4\" x10 RLE  Therapeutic Exercise Activity 6: standing knee flex x20  Therapeutic Exercise Activity 7: NBOS EOx30\", EC x30\"  Therapeutic Exercise Activity 8: Bridge x5    EDUCATION:  Outpatient Education  Individual(s) Educated: Patient  Education Provided: Fall Risk, Home Exercise Program, POC, " Post-Op Precautions  Patient/Caregiver Demonstrated Understanding: yes  Plan of Care Discussed and Agreed Upon: yes  Patient Response to Education: Patient/Caregiver Verbalized Understanding of Information    Goals:  Active       PT Problem       Patient will ambulate with normal gait pattern with no device.       Start:  01/05/24    Expected End:  02/16/24            Patient will ascend and descend 4-6 steps with rail modified independent reciprocal pattern.       Start:  01/05/24    Expected End:  02/16/24            Patient will achieve right hip flexion strength of at least 4+/5 for improved functional mobility.       Start:  01/05/24    Expected End:  02/16/24            Patient will achieve right hip abduction strength of at least 4+/5 for improved functional mobility.       Start:  01/05/24    Expected End:  02/16/24            Patient will demonstrate independence in home program for support of progression       Start:  01/05/24    Expected End:  01/19/24            Patient will show a significant change in LEFS patient reported outcome tool to demonstrate subjective imporovement       Start:  01/05/24    Expected End:  02/16/24

## 2024-01-08 ENCOUNTER — TREATMENT (OUTPATIENT)
Dept: PHYSICAL THERAPY | Facility: HOSPITAL | Age: 79
End: 2024-01-08
Payer: MEDICARE

## 2024-01-08 DIAGNOSIS — M25.551 RIGHT HIP PAIN: ICD-10-CM

## 2024-01-08 PROCEDURE — 97110 THERAPEUTIC EXERCISES: CPT | Mod: GP,CQ

## 2024-01-08 ASSESSMENT — PAIN - FUNCTIONAL ASSESSMENT: PAIN_FUNCTIONAL_ASSESSMENT: 0-10

## 2024-01-08 ASSESSMENT — PAIN SCALES - GENERAL: PAINLEVEL_OUTOF10: 5 - MODERATE PAIN

## 2024-01-08 NOTE — PROGRESS NOTES
Physical Therapy    Physical Therapy Treatment    Patient Name: Linda Van  MRN: [id number]  Today's Date: 1/8/2024     Time Calculation  Start Time: 1333  Stop Time: 1420  Time Calculation (min): 47 min    Assessment/Plan Pt. Able to tolerate all exercises, and had no c/o hip pain. Pt did report LBP, and demonstrated good balance this day eo/ec on giving surface. Pt. Continues to limp on R LE probably due to weak hip ABD. Witch we cannot strengthen as yet.  PT Assessment  PT Assessment Results: Decreased strength, Orthopedic restrictions, Impaired balance, Decreased range of motion, Decreased mobility  Rehab Prognosis: Good  Evaluation/Treatment Tolerance: Patient tolerated treatment well  PT Plan  Inpatient/Swing Bed or Outpatient: Outpatient  OP PT Plan  Treatment/Interventions: Education/ Instruction, Gait training, Manual therapy, Neuromuscular re-education, Therapeutic activities, Therapeutic exercises  PT Plan: Skilled PT  PT Frequency: 2 times per week  Duration: 6 weeks  Certification Period End Date: 03/29/24  Number of Treatments Authorized: 2 of 12  Rehab Potential: Good  Plan of Care Agreement: Patient  General Visit Information:   PT  Visit  PT Received On: 01/08/24     Subjective Pt. Reports LBP, but no hip pain today.  Precautions:  Precautions  STEADI Fall Risk Score (The score of 4 or more indicates an increased risk of falling): 5  LE Weight Bearing Status: Weight Bearing as Tolerated  Post-Surgical Precautions: Right hip precautions  Precautions Comment: no active hip abduction x4 weeks (12/19-1/16)    Objective Pt. Working on strengthening of Les and core for return to normal functional activities after GLORY.  Pain:  Pain Assessment  Pain Assessment: 0-10  Pain Score: 5 - Moderate pain  Pain Location: Back  Pain Orientation: Right, Left    Activity Tolerance:  Activity Tolerance  Endurance: Tolerates 30+ min exercise without fatigue  Treatments:  Therapeutic Exercise  Therapeutic Exercise  "Performed: Yes  Therapeutic Exercise Activity 1: LAQ x20  Therapeutic Exercise Activity 2: STS x10 @21\" height  Therapeutic Exercise Activity 3: standing hip ext/flex x 20 ea. 20  Therapeutic Exercise Activity 4: heel raises x20  Therapeutic Exercise Activity 5: step ups 4\" x15 RLE  Therapeutic Exercise Activity 6: standing knee flex x20  Therapeutic Exercise Activity 7: NBOS on cushion 30 sec EO/EC  Therapeutic Exercise Activity 8: Bridge x10  Therapeutic Exercise Activity 9: seated stepper lv 3 x 5 min.  Therapeutic Exercise Activity 10: gastroc stretch 30 sec. x 3      OP EDUCATION:  Outpatient Education  Individual(s) Educated: Patient  Education Provided: Home Exercise Program  Patient/Caregiver Demonstrated Understanding: yes  Patient Response to Education: Patient/Caregiver Verbalized Understanding of Information    Active       PT Problem       Patient will ambulate with normal gait pattern with no device.       Start:  01/05/24    Expected End:  02/16/24            Patient will ascend and descend 4-6 steps with rail modified independent reciprocal pattern.       Start:  01/05/24    Expected End:  02/16/24            Patient will achieve right hip flexion strength of at least 4+/5 for improved functional mobility.       Start:  01/05/24    Expected End:  02/16/24            Patient will achieve right hip abduction strength of at least 4+/5 for improved functional mobility.       Start:  01/05/24    Expected End:  02/16/24            Patient will demonstrate independence in home program for support of progression       Start:  01/05/24    Expected End:  01/19/24            Patient will show a significant change in LEFS patient reported outcome tool to demonstrate subjective imporovement       Start:  01/05/24    Expected End:  02/16/24                "

## 2024-01-10 ENCOUNTER — TREATMENT (OUTPATIENT)
Dept: PHYSICAL THERAPY | Facility: HOSPITAL | Age: 79
End: 2024-01-10
Payer: MEDICARE

## 2024-01-10 DIAGNOSIS — M25.551 RIGHT HIP PAIN: ICD-10-CM

## 2024-01-10 PROCEDURE — 97110 THERAPEUTIC EXERCISES: CPT | Mod: GP,CQ

## 2024-01-10 ASSESSMENT — PAIN - FUNCTIONAL ASSESSMENT: PAIN_FUNCTIONAL_ASSESSMENT: 0-10

## 2024-01-10 ASSESSMENT — PAIN SCALES - GENERAL: PAINLEVEL_OUTOF10: 5 - MODERATE PAIN

## 2024-01-10 NOTE — PROGRESS NOTES
"Physical Therapy    Physical Therapy Treatment    Patient Name: Linda Van  MRN: [id number]  Today's Date: 1/10/2024  Time Calculation  Start Time: 0907  Stop Time: 0949  Time Calculation (min): 42 min      Assessment:Pt was able to perform all PRE's without c/o R hip pain.  Pt reported she had decreased tightness in the R hip and it was easier to walk at the end of PT session today.  PT Assessment  PT Assessment Results: Decreased strength, Orthopedic restrictions, Impaired balance, Decreased range of motion, Decreased mobility  Rehab Prognosis: Good  Plan:Continue to progress current POC as tolerated to facilitate ability to perform functional activities.   OP PT Plan  PT Plan: Skilled PT  PT Frequency: 2 times per week  Certification Period End Date: 03/29/24  Number of Treatments Authorized: 3 of 12    Current Problem  1. Right hip pain  Follow Up In Physical Therapy          General  PT  Visit  PT Received On: 01/10/24  Response to Previous Treatment: Patient with no complaints from previous session.  General  Reason for Referral: right hip pain s/p GLORY  Past Medical History Relevant to Rehab: Right GLORY with small avulsion of greater trochanter on 11/8/2023    Subjective  Pt reports no adverse response to last PT session.   Precautions  Precautions  LE Weight Bearing Status: Weight Bearing as Tolerated  Post-Surgical Precautions: Right hip precautions  Precautions Comment: no active hip abduction x4 weeks (12/19-1/16)  Vital Signs     Pain  Pain Assessment  Pain Assessment: 0-10  Pain Score: 5 - Moderate pain  Pain Location: Back  Pain Orientation: Right, Left    Objective   Cognition     Posture     Extremity/Trunk Assessment      Activity Tolerance:     Outcome Measures:    Treatments:  Therapeutic Exercise  Therapeutic Exercise Performed: Yes  Therapeutic Exercise Activity 1: LAQ x20  Therapeutic Exercise Activity 2: STS x10 @21\" height  Therapeutic Exercise Activity 3: standing hip ext/flex x 20 ea. " "20  Therapeutic Exercise Activity 4: heel raises x20  Therapeutic Exercise Activity 5: step ups 4\" x15 RLE  Therapeutic Exercise Activity 6: standing knee flex x20  Therapeutic Exercise Activity 7: NBOS on cushion 30 sec EO/EC, 1/2 tandem 30\" R/L  Therapeutic Exercise Activity 8: Bridge x10  Therapeutic Exercise Activity 9: Nu Step Level 1 x 5'  Therapeutic Exercise Activity 11: hamstring curls red x 20  Therapeutic Exercise Activity 12: heel slide with PPT to stretch hip flexors x 10         OP EDUCATION:  Outpatient Education  Individual(s) Educated: Patient  Education Provided: Other (soft tissue mobilization around incision area)  Patient Response to Education: Patient/Caregiver Verbalized Understanding of Information    Goals:  Active       PT Problem       Patient will ambulate with normal gait pattern with no device.       Start:  01/05/24    Expected End:  02/16/24            Patient will ascend and descend 4-6 steps with rail modified independent reciprocal pattern.       Start:  01/05/24    Expected End:  02/16/24            Patient will achieve right hip flexion strength of at least 4+/5 for improved functional mobility.       Start:  01/05/24    Expected End:  02/16/24            Patient will achieve right hip abduction strength of at least 4+/5 for improved functional mobility.       Start:  01/05/24    Expected End:  02/16/24            Patient will demonstrate independence in home program for support of progression       Start:  01/05/24    Expected End:  01/19/24            Patient will show a significant change in LEFS patient reported outcome tool to demonstrate subjective imporovement       Start:  01/05/24    Expected End:  02/16/24              "

## 2024-01-15 ENCOUNTER — TREATMENT (OUTPATIENT)
Dept: PHYSICAL THERAPY | Facility: HOSPITAL | Age: 79
End: 2024-01-15
Payer: MEDICARE

## 2024-01-15 DIAGNOSIS — M25.551 RIGHT HIP PAIN: ICD-10-CM

## 2024-01-15 PROCEDURE — 97110 THERAPEUTIC EXERCISES: CPT | Mod: GP,CQ

## 2024-01-15 ASSESSMENT — PAIN - FUNCTIONAL ASSESSMENT: PAIN_FUNCTIONAL_ASSESSMENT: 0-10

## 2024-01-15 ASSESSMENT — PAIN SCALES - GENERAL: PAINLEVEL_OUTOF10: 0 - NO PAIN

## 2024-01-15 NOTE — PROGRESS NOTES
Physical Therapy    Physical Therapy Treatment    Patient Name: Linda Van  MRN: [id number]  Today's Date: 1/15/2024     Time Calculation  Start Time: 1332  Stop Time: 1416  Time Calculation (min): 44 min    Assessment/Plan pt. Able to tolerate all exercises, and reports she will see doctor this week will continue PT POC.  PT Assessment  PT Assessment Results: Decreased strength, Orthopedic restrictions, Impaired balance, Decreased range of motion, Decreased mobility  Rehab Prognosis: Good  Evaluation/Treatment Tolerance: Patient tolerated treatment well  PT Plan  Inpatient/Swing Bed or Outpatient: Outpatient  OP PT Plan  Treatment/Interventions: Education/ Instruction  PT Plan: Skilled PT  PT Frequency: 2 times per week  Duration: 6 weeks  Certification Period End Date: 03/29/24  Number of Treatments Authorized: 4 of 12  Rehab Potential: Good  Plan of Care Agreement: Patient    General Visit Information:   PT  Visit  PT Received On: 01/15/24  Response to Previous Treatment: Patient with no complaints from previous session.  General  Reason for Referral: right hip pain s/p GLORY  Past Medical History Relevant to Rehab: Right GLORY with small avulsion of greater trochanter on 11/8/2023    Subjective Pt reports she is feeling better. No pain in R hip, but does experience LBP with slight limp  Precautions:  Precautions  STEADI Fall Risk Score (The score of 4 or more indicates an increased risk of falling): 5  LE Weight Bearing Status: Weight Bearing as Tolerated  Post-Surgical Precautions: Right hip precautions  Precautions Comment: no active hip abduction x4 weeks (12/19-1/16)  Objective Pt working on stretching and strengthening exercises to R hip for return to normal functional activities after GLORY  Pain:  Pain Assessment  Pain Assessment: 0-10  Pain Score: 0 - No pain  Pain Location: Back (5/10 pain in back)  Pain Orientation: Right, Left  Activity Tolerance:  Activity Tolerance  Endurance: Tolerates 30+ min  "exercise without fatigue  Treatments:  Therapeutic Exercise  Therapeutic Exercise Performed: Yes  Therapeutic Exercise Activity 1: LAQ x20  Therapeutic Exercise Activity 2: STS x10 @21\" height  Therapeutic Exercise Activity 3: standing hip ext/flex x 20 ea. 20  Therapeutic Exercise Activity 4: heel raises x20  Therapeutic Exercise Activity 5: step ups 4 inches R LE  Therapeutic Exercise Activity 6: standing knee flex x20  Therapeutic Exercise Activity 7: NBOS on cushion 30 sec EO/EC, 1/2 tandem 30\" R/L  Therapeutic Exercise Activity 8: Bridge x10  Therapeutic Exercise Activity 9: Nu Step Level 3 x 5'  Therapeutic Exercise Activity 10: gastroc stretch 30 sec. x 3  Therapeutic Exercise Activity 11: hamstring curls red x 20  Therapeutic Exercise Activity 12: heel slide with PPT to stretch hip flexors x 10      OP EDUCATION:  Outpatient Education  Individual(s) Educated: Patient  Education Provided: Home Exercise Program  Patient/Caregiver Demonstrated Understanding: yes  Patient Response to Education: Patient/Caregiver Verbalized Understanding of Information    Active       PT Problem       Patient will ambulate with normal gait pattern with no device.       Start:  01/05/24    Expected End:  02/16/24            Patient will ascend and descend 4-6 steps with rail modified independent reciprocal pattern.       Start:  01/05/24    Expected End:  02/16/24            Patient will achieve right hip flexion strength of at least 4+/5 for improved functional mobility.       Start:  01/05/24    Expected End:  02/16/24            Patient will achieve right hip abduction strength of at least 4+/5 for improved functional mobility.       Start:  01/05/24    Expected End:  02/16/24            Patient will demonstrate independence in home program for support of progression       Start:  01/05/24    Expected End:  01/19/24            Patient will show a significant change in LEFS patient reported outcome tool to demonstrate subjective " imporovement       Start:  01/05/24    Expected End:  02/16/24

## 2024-01-17 ENCOUNTER — TREATMENT (OUTPATIENT)
Dept: PHYSICAL THERAPY | Facility: HOSPITAL | Age: 79
End: 2024-01-17
Payer: MEDICARE

## 2024-01-17 DIAGNOSIS — M25.551 RIGHT HIP PAIN: ICD-10-CM

## 2024-01-17 PROCEDURE — 97110 THERAPEUTIC EXERCISES: CPT | Mod: GP,CQ

## 2024-01-17 ASSESSMENT — PAIN SCALES - GENERAL: PAINLEVEL_OUTOF10: 0 - NO PAIN

## 2024-01-17 ASSESSMENT — PAIN - FUNCTIONAL ASSESSMENT: PAIN_FUNCTIONAL_ASSESSMENT: 0-10

## 2024-01-17 NOTE — PROGRESS NOTES
"Physical Therapy    Physical Therapy Treatment    Patient Name: Linda Van  MRN: [id number]  Today's Date: 1/17/2024  Time Calculation  Start Time: 1005  Stop Time: 1044  Time Calculation (min): 39 min      Assessment:Pt demonstrating good tolerance with all TE's performed.  Pt demonstrated good dynamic balance on compliant surface, with no LOB with all movements.    PT Assessment  PT Assessment Results: Decreased strength, Orthopedic restrictions, Impaired balance, Decreased range of motion, Decreased mobility  Rehab Prognosis: Good  Plan:Continue to progress current POC as tolerated to facilitate ability to perform functional activities.   OP PT Plan  PT Plan: Skilled PT  PT Frequency: 2 times per week  Duration: 6 weeks  Certification Period End Date: 03/29/24  Number of Treatments Authorized: 5 of 12    Current Problem  1. Right hip pain  Follow Up In Physical Therapy          General  PT  Visit  Response to Previous Treatment: Patient with no complaints from previous session.  General  Reason for Referral: right hip pain s/p GLORY  Past Medical History Relevant to Rehab: Right GLORY with small avulsion of greater trochanter on 11/8/2023    Subjective  Pt reports she had a follow up with Dr Davis yesterday, he advised to hold abduction exercises for one more week.   Precautions  Precautions  LE Weight Bearing Status: Weight Bearing as Tolerated  Post-Surgical Precautions: Right hip precautions  Precautions Comment: no active hip abduction x5 weeks (12/19-1/23)  Vital Signs     Pain  Pain Assessment  Pain Assessment: 0-10  Pain Score: 0 - No pain  Pain Location: Hip  Pain Orientation: Right    Objective   Cognition     Posture     Extremity/Trunk Assessment      Activity Tolerance:     Outcome Measures:    Treatments:  Therapeutic Exercise  Therapeutic Exercise Performed: Yes  Therapeutic Exercise Activity 1: LAQ 3# x 20  Therapeutic Exercise Activity 2: STS x20 @21\" height  Therapeutic Exercise Activity 3: " standing hip ext/flex x 20 ea. 20  Therapeutic Exercise Activity 4: heel raises x20  Therapeutic Exercise Activity 5: step ups 4 inches R LE  Therapeutic Exercise Activity 6: standing knee flex x20  Therapeutic Exercise Activity 7: NBOS on cushion 30 sec EC  Therapeutic Exercise Activity 8: Bridge x10  Therapeutic Exercise Activity 9: Nu Step Level 3 x 5'  Therapeutic Exercise Activity 10: gastroc stretch 30 sec. x 3  Therapeutic Exercise Activity 11: hamstring curls red x 20  Therapeutic Exercise Activity 12: heel slide with PPT to stretch hip flexors x 10  Therapeutic Exercise Activity 13: AirEx trunk flex/ext, PNF 2# x 10, lateral flexion x 10  Therapeutic Exercise Activity 14: No ABD until 1-23         OP EDUCATION:  Outpatient Education  Individual(s) Educated: Patient  Education Provided: Home Exercise Program  Patient Response to Education: Patient/Caregiver Verbalized Understanding of Information    Goals:  Active       PT Problem       Patient will ambulate with normal gait pattern with no device.       Start:  01/05/24    Expected End:  02/16/24            Patient will ascend and descend 4-6 steps with rail modified independent reciprocal pattern.       Start:  01/05/24    Expected End:  02/16/24            Patient will achieve right hip flexion strength of at least 4+/5 for improved functional mobility.       Start:  01/05/24    Expected End:  02/16/24            Patient will achieve right hip abduction strength of at least 4+/5 for improved functional mobility.       Start:  01/05/24    Expected End:  02/16/24            Patient will demonstrate independence in home program for support of progression       Start:  01/05/24    Expected End:  01/19/24            Patient will show a significant change in LEFS patient reported outcome tool to demonstrate subjective imporovement       Start:  01/05/24    Expected End:  02/16/24

## 2024-01-22 ENCOUNTER — TREATMENT (OUTPATIENT)
Dept: PHYSICAL THERAPY | Facility: HOSPITAL | Age: 79
End: 2024-01-22
Payer: MEDICARE

## 2024-01-22 DIAGNOSIS — M25.551 RIGHT HIP PAIN: ICD-10-CM

## 2024-01-22 PROCEDURE — 97110 THERAPEUTIC EXERCISES: CPT | Mod: GP,CQ

## 2024-01-22 ASSESSMENT — PAIN SCALES - GENERAL: PAINLEVEL_OUTOF10: 0 - NO PAIN

## 2024-01-22 ASSESSMENT — PAIN - FUNCTIONAL ASSESSMENT: PAIN_FUNCTIONAL_ASSESSMENT: 0-10

## 2024-01-22 NOTE — PROGRESS NOTES
Physical Therapy    Physical Therapy Treatment    Patient Name: Linda Van  MRN: [id number]  Today's Date: 1/22/2024     Time Calculation  Start Time: 1332  Stop Time: 1416  Time Calculation (min): 44 min    Assessment/Plan Pt. Tolerated all exercises without difficulty, and was able to balance NBOS EO/EC, and complete dynamic balance on giving surface without LOB or much sway. We will continue PT POC.  PT Assessment  PT Assessment Results: Decreased strength, Orthopedic restrictions, Impaired balance, Decreased range of motion, Decreased mobility  Rehab Prognosis: Good  Evaluation/Treatment Tolerance: Patient tolerated treatment well  PT Plan  Inpatient/Swing Bed or Outpatient: Outpatient  OP PT Plan  Treatment/Interventions: Education/ Instruction  PT Plan: Skilled PT  PT Frequency: 2 times per week  Duration: 6 weeks  Certification Period End Date: 03/29/24  Number of Treatments Authorized: 6of 12  Rehab Potential: Good  Plan of Care Agreement: Patient  General Visit Information:   PT  Visit  PT Received On: 01/22/24  Response to Previous Treatment: Patient with no complaints from previous session.  General  Reason for Referral: right hip pain s/p GLORY  Past Medical History Relevant to Rehab: Right GLORY with small avulsion of greater trochanter on 11/8/2023    Subjective Pt. Reports no new complaints today. Her hip is good, no pain. LB hurts a little.  Precautions:  Precautions  STEADI Fall Risk Score (The score of 4 or more indicates an increased risk of falling): 5  LE Weight Bearing Status: Weight Bearing as Tolerated  Post-Surgical Precautions: Right hip precautions  Precautions Comment: no active hip abduction x5 weeks (12/19-1/23)    Objective Pt. Working on hip strength and balance activities to return to normal functional activities after GLORY.  Pain:  Pain Assessment  Pain Assessment: 0-10  Pain Score: 0 - No pain  Pain Location: Hip  Pain Orientation: Right  Activity Tolerance:  Activity  "Tolerance  Endurance: Tolerates 30+ min exercise without fatigue  Treatments:  Therapeutic Exercise  Therapeutic Exercise Performed: Yes  Therapeutic Exercise Activity 1: LAQ 3# x 20  Therapeutic Exercise Activity 2: STS x20 @21\" height red ball  Therapeutic Exercise Activity 3: standing hip ext/flex x 20 ea. 20  Therapeutic Exercise Activity 4: heel raises x20  Therapeutic Exercise Activity 5: step ups 4 inches R LE x 20  Therapeutic Exercise Activity 6: standing knee flex x20  Therapeutic Exercise Activity 7: NBOS on cushion 30 sec EC  Therapeutic Exercise Activity 8: Bridge x10  Therapeutic Exercise Activity 9: Nu Step Level 4 x 5'  Therapeutic Exercise Activity 10: gastroc stretch 30 sec. x 3  Therapeutic Exercise Activity 11: hamstring curls red x 20  Therapeutic Exercise Activity 12: heel slide with PPT to stretch hip flexors x 10  Therapeutic Exercise Activity 13: AirEx trunk flex/ext, PNF 2# x 10, lateral flexion x 10      OP EDUCATION:  Outpatient Education  Individual(s) Educated: Patient  Education Provided: Home Exercise Program  Patient/Caregiver Demonstrated Understanding: yes  Patient Response to Education: Patient/Caregiver Verbalized Understanding of Information    Active       PT Problem       Patient will ambulate with normal gait pattern with no device.       Start:  01/05/24    Expected End:  02/16/24            Patient will ascend and descend 4-6 steps with rail modified independent reciprocal pattern.       Start:  01/05/24    Expected End:  02/16/24            Patient will achieve right hip flexion strength of at least 4+/5 for improved functional mobility.       Start:  01/05/24    Expected End:  02/16/24            Patient will achieve right hip abduction strength of at least 4+/5 for improved functional mobility.       Start:  01/05/24    Expected End:  02/16/24            Patient will demonstrate independence in home program for support of progression       Start:  01/05/24    Expected " End:  01/19/24            Patient will show a significant change in LEFS patient reported outcome tool to demonstrate subjective imporovement       Start:  01/05/24    Expected End:  02/16/24

## 2024-01-24 ENCOUNTER — TREATMENT (OUTPATIENT)
Dept: PHYSICAL THERAPY | Facility: HOSPITAL | Age: 79
End: 2024-01-24
Payer: MEDICARE

## 2024-01-24 DIAGNOSIS — M25.551 RIGHT HIP PAIN: Primary | ICD-10-CM

## 2024-01-24 PROCEDURE — 97110 THERAPEUTIC EXERCISES: CPT | Mod: GP | Performed by: PHYSICAL THERAPIST

## 2024-01-24 ASSESSMENT — PAIN SCALES - GENERAL: PAINLEVEL_OUTOF10: 5 - MODERATE PAIN

## 2024-01-24 ASSESSMENT — PAIN - FUNCTIONAL ASSESSMENT: PAIN_FUNCTIONAL_ASSESSMENT: 0-10

## 2024-01-24 NOTE — PROGRESS NOTES
"Physical Therapy    Physical Therapy Treatment    Patient Name: Linda Van  MRN: [id number]  Today's Date: 1/24/2024  Time Calculation  Start Time: 1002  Stop Time: 1044  Time Calculation (min): 42 min      Assessment:  PT Assessment  PT Assessment Results: Decreased strength, Orthopedic restrictions, Impaired balance, Decreased range of motion, Decreased mobility  Rehab Prognosis: Good  Evaluation/Treatment Tolerance: Patient tolerated treatment well  Patient states that she had \"catching\" during lateral step ups on a 4\" step today after 10 reps. Patient states that the pain is mostly in her gluts, not at her greater trochanter/lateral hip. Patient demonstrated good tolerance to introduction of hip abduction exercises.    Plan:  OP PT Plan  Treatment/Interventions: Education/ Instruction, Gait training, Manual therapy, Neuromuscular re-education, Therapeutic activities, Therapeutic exercises  PT Plan: Skilled PT  PT Frequency: 2 times per week  Duration: 6 weeks  Onset Date: 11/08/23  Certification Period Start Date: 01/05/24  Certification Period End Date: 03/29/24  Number of Treatments Authorized: 7 of 12  Rehab Potential: Good  Plan of Care Agreement: Patient    Current Problem  1. Right hip pain  Follow Up In Physical Therapy          General  PT  Visit  PT Received On: 01/24/24  General  Reason for Referral: right hip pain s/p GLORY  Referred By: Dr. Davis  Past Medical History Relevant to Rehab: Right GLORY with small avulsion of greater trochanter on 11/8/2023  Patient states that her hip feels good. She states that her pain is in her low back.     Subjective    Precautions  Precautions  LE Weight Bearing Status: Weight Bearing as Tolerated  Post-Surgical Precautions: Right hip precautions    Pain  Pain Assessment  Pain Assessment: 0-10  Pain Score: 5 - Moderate pain  Pain Location: Back  Pain Orientation: Lower, Right    Objective     Treatments:  Therapeutic Exercise  Therapeutic Exercise Performed: " "Yes  Therapeutic Exercise Activity 1: LAQ 4# x15  Therapeutic Exercise Activity 2: STS x20 @21\" height  Therapeutic Exercise Activity 3: standing hip ext/flex x 20 ea  Therapeutic Exercise Activity 4: heel raises x20  Therapeutic Exercise Activity 5: step ups fwd 6 inches R LE x 20  Therapeutic Exercise Activity 6: standing knee flex x20  Therapeutic Exercise Activity 9: Nu Step Level 4 x 5'  Therapeutic Exercise Activity 10: gastroc stretch on wedge x1'  Therapeutic Exercise Activity 11: hamstring curls grn x 20  Therapeutic Exercise Activity 12: heel slide with PPT to stretch hip flexors x 10  Therapeutic Exercise Activity 13: AirEx trunk flex/ext x10, PNF 2# x 10, lateral flexion x 10  Therapeutic Exercise Activity 14: supine hip abd x10  Therapeutic Exercise Activity 15: sidestepping in // 6'x4 R/L  Therapeutic Exercise Activity 16: step ups lateral 4\" x10    OP EDUCATION:  Outpatient Education  Individual(s) Educated: Patient  Education Provided: Home Exercise Program  Patient/Caregiver Demonstrated Understanding: yes  Plan of Care Discussed and Agreed Upon: yes  Patient Response to Education: Patient/Caregiver Verbalized Understanding of Information    Goals:  Active       PT Problem       Patient will ambulate with normal gait pattern with no device.       Start:  01/05/24    Expected End:  02/16/24            Patient will ascend and descend 4-6 steps with rail modified independent reciprocal pattern.       Start:  01/05/24    Expected End:  02/16/24            Patient will achieve right hip flexion strength of at least 4+/5 for improved functional mobility.       Start:  01/05/24    Expected End:  02/16/24            Patient will achieve right hip abduction strength of at least 4+/5 for improved functional mobility.       Start:  01/05/24    Expected End:  02/16/24            Patient will demonstrate independence in home program for support of progression       Start:  01/05/24    Expected End:  01/19/24    "         Patient will show a significant change in LEFS patient reported outcome tool to demonstrate subjective imporovement       Start:  01/05/24    Expected End:  02/16/24

## 2024-01-29 ENCOUNTER — TREATMENT (OUTPATIENT)
Dept: PHYSICAL THERAPY | Facility: HOSPITAL | Age: 79
End: 2024-01-29
Payer: MEDICARE

## 2024-01-29 DIAGNOSIS — M25.551 RIGHT HIP PAIN: ICD-10-CM

## 2024-01-29 PROCEDURE — 97110 THERAPEUTIC EXERCISES: CPT | Mod: GP,CQ

## 2024-01-29 ASSESSMENT — PAIN - FUNCTIONAL ASSESSMENT: PAIN_FUNCTIONAL_ASSESSMENT: 0-10

## 2024-01-29 ASSESSMENT — PAIN SCALES - GENERAL: PAINLEVEL_OUTOF10: 0 - NO PAIN

## 2024-01-29 NOTE — PROGRESS NOTES
Physical Therapy    Physical Therapy Treatment    Patient Name: Linda Van  MRN: [id number]  Today's Date: 1/29/2024     Time Calculation  Start Time: 1330  Stop Time: 1420  Time Calculation (min): 50 min    Assessment/Plan   PT Assessment  PT Assessment Results: Decreased strength, Orthopedic restrictions, Impaired balance, Decreased range of motion, Decreased mobility  Rehab Prognosis: Good  Evaluation/Treatment Tolerance: Patient tolerated treatment well  PT Plan  Inpatient/Swing Bed or Outpatient: Outpatient  OP PT Plan  Treatment/Interventions: Education/ Instruction  PT Plan: Skilled PT  PT Frequency: 2 times per week  Duration: 6 weeks  Onset Date: 11/08/23  Certification Period Start Date: 01/05/24  Certification Period End Date: 03/29/24  Number of Treatments Authorized: 8 of 12  Rehab Potential: Good  Plan of Care Agreement: Patient  General Visit Information:   PT  Visit  PT Received On: 01/29/24  General  Reason for Referral: right hip pain s/p GLORY  Referred By: Dr. Davis  Past Medical History Relevant to Rehab: Right GLORY with small avulsion of greater trochanter on 11/8/2023    Subjective Pt reports her feel good with almost no pain, but she does continue to limp during gait. Pt experiences much LBP.  Precautions:  Precautions  STEADI Fall Risk Score (The score of 4 or more indicates an increased risk of falling): 5  LE Weight Bearing Status: Weight Bearing as Tolerated  Post-Surgical Precautions: Right hip precautions  Precautions Comment: no active hip abduction x5 weeks (12/19-1/23)    Objective Pt. Is now able to work on strengthening her hip abductors which remain weak as she was restricted with R Hip abd until last week. Pt given hip abd exercises for HEP this week, and we will continue to work on strengthening as tolerated.  Pain:  Pain Assessment  Pain Assessment: 0-10  Pain Score: 0 - No pain  Pain Location: Hip  Activity Tolerance:  Activity Tolerance  Endurance: Tolerates 30+ min exercise  "without fatigue  Treatments:  Therapeutic Exercise  Therapeutic Exercise Performed: Yes  Therapeutic Exercise Activity 1: LAQ 4# x15  Therapeutic Exercise Activity 3: standing hip ext/flex x 20 ea  Therapeutic Exercise Activity 4: heel raises x20  Therapeutic Exercise Activity 5: step ups fwd 6 inches R LE x 20  Therapeutic Exercise Activity 6: standing knee flex x20  Therapeutic Exercise Activity 9: Nu Step Level 4 x 5'  Therapeutic Exercise Activity 10: gastroc stretch on wedge x1'  Therapeutic Exercise Activity 11: hamstring curls grn x 20  Therapeutic Exercise Activity 12: heel slide with PPT to stretch hip flexors x 10  Therapeutic Exercise Activity 13: AirEx trunk flex/ext x10, PNF 2# x 10, lateral flexion x 10  Therapeutic Exercise Activity 14: supine hip ABD with iso on opposit hip 2 x 10 ea.  Therapeutic Exercise Activity 15: sidestepping in // 6'x4 R/L  Therapeutic Exercise Activity 16: step ups lateral 4\" x10      OP EDUCATION:  Outpatient Education  Individual(s) Educated: Patient  Education Provided: Home Exercise Program  Patient/Caregiver Demonstrated Understanding: yes  Patient Response to Education: Patient/Caregiver Verbalized Understanding of Information    Active       PT Problem       Patient will ambulate with normal gait pattern with no device.       Start:  01/05/24    Expected End:  02/16/24            Patient will ascend and descend 4-6 steps with rail modified independent reciprocal pattern.       Start:  01/05/24    Expected End:  02/16/24            Patient will achieve right hip flexion strength of at least 4+/5 for improved functional mobility.       Start:  01/05/24    Expected End:  02/16/24            Patient will achieve right hip abduction strength of at least 4+/5 for improved functional mobility.       Start:  01/05/24    Expected End:  02/16/24            Patient will demonstrate independence in home program for support of progression       Start:  01/05/24    Expected End:  " 01/19/24            Patient will show a significant change in LEFS patient reported outcome tool to demonstrate subjective imporovement       Start:  01/05/24    Expected End:  02/16/24

## 2024-01-31 ENCOUNTER — TREATMENT (OUTPATIENT)
Dept: PHYSICAL THERAPY | Facility: HOSPITAL | Age: 79
End: 2024-01-31
Payer: MEDICARE

## 2024-01-31 DIAGNOSIS — M25.551 RIGHT HIP PAIN: ICD-10-CM

## 2024-01-31 PROCEDURE — 97110 THERAPEUTIC EXERCISES: CPT | Mod: GP,CQ

## 2024-01-31 PROCEDURE — 97140 MANUAL THERAPY 1/> REGIONS: CPT | Mod: GP,CQ

## 2024-01-31 ASSESSMENT — PAIN - FUNCTIONAL ASSESSMENT: PAIN_FUNCTIONAL_ASSESSMENT: 0-10

## 2024-01-31 ASSESSMENT — PAIN SCALES - GENERAL: PAINLEVEL_OUTOF10: 0 - NO PAIN

## 2024-01-31 NOTE — PROGRESS NOTES
Physical Therapy    Physical Therapy Treatment    Patient Name: Linda Van  MRN: [id number]  Today's Date: 1/31/2024  Time Calculation  Start Time: 1002  Stop Time: 1043  Time Calculation (min): 41 min      Assessment:Noted increased mm tension and and tenderness with palpation to the R gluteus medius and TFL, which improved with TPR.  Pt required to avoid movement at the pelvis and trunk with standing R hip abduction, with good follow through demonstrated.    PT Assessment  PT Assessment Results: Decreased strength, Orthopedic restrictions, Impaired balance, Decreased range of motion, Decreased mobility  Rehab Prognosis: Good  Plan:Continue to progress current POC as tolerated to facilitate ability to perform functional activities.   OP PT Plan  PT Plan: Skilled PT  PT Frequency: 2 times per week  Duration: 6 weeks  Onset Date: 11/08/23  Certification Period Start Date: 01/05/24  Certification Period End Date: 03/29/24  Number of Treatments Authorized: 9 of 12    Current Problem  1. Right hip pain  Follow Up In Physical Therapy          General  PT  Visit  PT Received On: 01/31/24  Response to Previous Treatment: Patient with no complaints from previous session.  General  Reason for Referral: right hip pain s/p GLORY  Referred By: Dr. Davis  Past Medical History Relevant to Rehab: Right GLORY with small avulsion of greater trochanter on 11/8/2023    Subjective  Pt states she is able to walk short distances without her cane.  Pt states she is still concerned about not being able to walk without a cane as she has a tendency to limp.   Precautions  Precautions  STEADI Fall Risk Score (The score of 4 or more indicates an increased risk of falling): 5  LE Weight Bearing Status: Weight Bearing as Tolerated  Post-Surgical Precautions: Right hip precautions  Vital Signs     Pain  Pain Assessment  Pain Assessment: 0-10  Pain Score: 0 - No pain  Pain Location: Hip  Pain Orientation: Right    Objective   Cognition    "  Posture     Extremity/Trunk Assessment       Activity Tolerance:     Outcome Measures:    Treatments:  Therapeutic Exercise  Therapeutic Exercise Performed: Yes  Therapeutic Exercise Activity 1: LAQ 4# x15  Therapeutic Exercise Activity 2: STS x20 @21\" height  Therapeutic Exercise Activity 3: standing hip ext/flex x 20 ea  Therapeutic Exercise Activity 4: heel raises x20  Therapeutic Exercise Activity 5: step ups fwd 6 inches R LE x 20  Therapeutic Exercise Activity 6: standing knee flex x20  Therapeutic Exercise Activity 7: NBOS on cushion 30 sec EC  Therapeutic Exercise Activity 8: Bridge x10  Therapeutic Exercise Activity 9: Nu Step Level 4 x 5'  Therapeutic Exercise Activity 10: L Hamstring stretch  Therapeutic Exercise Activity 11: hamstring curls grn x 20  Therapeutic Exercise Activity 12: standing hip flexor stretch at door x 10  Therapeutic Exercise Activity 13: AirEx trunk flex/ext x10, PNF 2# x 10, lateral flexion x 10  Therapeutic Exercise Activity 14: supine hip ABD with iso on opposit hip 2 x 10 ea.  Therapeutic Exercise Activity 15: sidestepping in // 6'x4 R/L  Therapeutic Exercise Activity 16: step ups lateral 4\" x10    Manual Therapy  Manual Therapy Performed: Yes  Manual Therapy Activity 1: TPR R gluteus medius and TFL to decrease mm tension    OP EDUCATION:  Outpatient Education  Individual(s) Educated: Patient  Education Provided: Home Exercise Program  Patient Response to Education: Patient/Caregiver Verbalized Understanding of Information    Goals:  Active       PT Problem       Patient will ambulate with normal gait pattern with no device.       Start:  01/05/24    Expected End:  02/16/24            Patient will ascend and descend 4-6 steps with rail modified independent reciprocal pattern.       Start:  01/05/24    Expected End:  02/16/24            Patient will achieve right hip flexion strength of at least 4+/5 for improved functional mobility.       Start:  01/05/24    Expected End:  " 02/16/24            Patient will achieve right hip abduction strength of at least 4+/5 for improved functional mobility.       Start:  01/05/24    Expected End:  02/16/24            Patient will demonstrate independence in home program for support of progression       Start:  01/05/24    Expected End:  01/19/24            Patient will show a significant change in LEFS patient reported outcome tool to demonstrate subjective imporovement       Start:  01/05/24    Expected End:  02/16/24

## 2024-02-05 ENCOUNTER — TREATMENT (OUTPATIENT)
Dept: PHYSICAL THERAPY | Facility: HOSPITAL | Age: 79
End: 2024-02-05
Payer: MEDICARE

## 2024-02-05 DIAGNOSIS — M25.551 RIGHT HIP PAIN: ICD-10-CM

## 2024-02-05 PROCEDURE — 97110 THERAPEUTIC EXERCISES: CPT | Mod: GP,CQ

## 2024-02-05 ASSESSMENT — PAIN SCALES - GENERAL: PAINLEVEL_OUTOF10: 0 - NO PAIN

## 2024-02-05 ASSESSMENT — PAIN - FUNCTIONAL ASSESSMENT: PAIN_FUNCTIONAL_ASSESSMENT: 0-10

## 2024-02-05 NOTE — PROGRESS NOTES
Physical Therapy    Physical Therapy Treatment    Patient Name: Linda Van  MRN: [id number]  Today's Date: 2/5/2024     Time Calculation  Start Time: 1330  Stop Time: 1415  Time Calculation (min): 45 min    Assessment/Plan Pt. Tolerates all exercises, but still continues to limp. Added increased ankle/hip exercises today to strengthen as much as possible to eliminate her limp. Pts quad/HS manual muscle test 5/5 hip although stronger is still not WNL. We will continue PT POC and add more home exercises.  PT Assessment  PT Assessment Results: Decreased strength, Orthopedic restrictions, Impaired balance, Decreased range of motion, Decreased mobility  Rehab Prognosis: Good  Evaluation/Treatment Tolerance: Patient tolerated treatment well  PT Plan  Inpatient/Swing Bed or Outpatient: Outpatient  OP PT Plan  Treatment/Interventions: Education/ Instruction  PT Plan: Skilled PT  PT Frequency: 2 times per week  Duration: 6 weeks  Onset Date: 11/08/23  Certification Period Start Date: 01/05/24  Certification Period End Date: 03/29/24  Number of Treatments Authorized: 10 of 12  Rehab Potential: Good  Plan of Care Agreement: Patient  General Visit Information:   PT  Visit  PT Received On: 02/05/24  Response to Previous Treatment: Patient with no complaints from previous session.  General  Reason for Referral: right hip pain s/p GLORY  Referred By: Dr. Davis  Past Medical History Relevant to Rehab: Right GLORY with small avulsion of greater trochanter on 11/8/2023    Subjective Pt. Reports she has no hip pain she has LBP, and she  continues to limp.  Precautions:  Precautions  STEADI Fall Risk Score (The score of 4 or more indicates an increased risk of falling): 5  LE Weight Bearing Status: Weight Bearing as Tolerated  Post-Surgical Precautions: Right hip precautions    Objective Pt. Is working on strengthening of R hip to fascilitate return to normal functional activities with decreased pain.  Pain:  Pain Assessment  Pain  "Assessment: 0-10  Pain Score: 0 - No pain  Pain Location: Hip  Pain Orientation: Right  Activity Tolerance:  Activity Tolerance  Endurance: Tolerates 30+ min exercise without fatigue  Treatments:  Therapeutic Exercise  Therapeutic Exercise Performed: Yes  Therapeutic Exercise Activity 3: standing hip ext/flex x 20 ea  Therapeutic Exercise Activity 4: heel raises x20  Therapeutic Exercise Activity 5: step ups fwd 6 inches R LE x 20  Therapeutic Exercise Activity 6: standing knee flex x20  Therapeutic Exercise Activity 7: NBOS on cushion 30 sec EC  Therapeutic Exercise Activity 9: Nu Step Level 4 x 5'  Therapeutic Exercise Activity 11: hamstring curls grn x 20  Therapeutic Exercise Activity 12: standing hip flexor stretch at door x 10  Therapeutic Exercise Activity 13: AirEx trunk flex/ext x10, PNF 2# x 10, lateral flexion x 10  Therapeutic Exercise Activity 15: sidestepping in // 6'x4 R/L  Therapeutic Exercise Activity 16: step ups lateral 4\" x10  OP EDUCATION:  Outpatient Education  Individual(s) Educated: Patient  Education Provided: Home Exercise Program  Patient/Caregiver Demonstrated Understanding: yes  Patient Response to Education: Patient/Caregiver Verbalized Understanding of Information    Active       PT Problem       Patient will ambulate with normal gait pattern with no device.       Start:  01/05/24    Expected End:  02/16/24            Patient will ascend and descend 4-6 steps with rail modified independent reciprocal pattern.       Start:  01/05/24    Expected End:  02/16/24            Patient will achieve right hip flexion strength of at least 4+/5 for improved functional mobility.       Start:  01/05/24    Expected End:  02/16/24            Patient will achieve right hip abduction strength of at least 4+/5 for improved functional mobility.       Start:  01/05/24    Expected End:  02/16/24            Patient will demonstrate independence in home program for support of progression       Start:  " 01/05/24    Expected End:  01/19/24            Patient will show a significant change in LEFS patient reported outcome tool to demonstrate subjective imporovement       Start:  01/05/24    Expected End:  02/16/24

## 2024-02-12 ENCOUNTER — TREATMENT (OUTPATIENT)
Dept: PHYSICAL THERAPY | Facility: HOSPITAL | Age: 79
End: 2024-02-12
Payer: MEDICARE

## 2024-02-12 DIAGNOSIS — M25.551 RIGHT HIP PAIN: Primary | ICD-10-CM

## 2024-02-12 PROCEDURE — 97110 THERAPEUTIC EXERCISES: CPT | Mod: GP | Performed by: PHYSICAL THERAPIST

## 2024-02-12 ASSESSMENT — PAIN - FUNCTIONAL ASSESSMENT: PAIN_FUNCTIONAL_ASSESSMENT: 0-10

## 2024-02-12 ASSESSMENT — PAIN SCALES - GENERAL: PAINLEVEL_OUTOF10: 5 - MODERATE PAIN

## 2024-02-12 NOTE — PROGRESS NOTES
Physical Therapy    Physical Therapy Re-Evaluation and Treatment      Patient Name: Linda Van  MRN: [id number]  Today's Date: 2/12/2024  Time Calculation  Start Time: 1003  Stop Time: 1043  Time Calculation (min): 40 min    Assessment:  PT Assessment  PT Assessment Results: Decreased strength, Orthopedic restrictions, Impaired balance, Decreased range of motion, Decreased mobility  Rehab Prognosis: Good  Evaluation/Treatment Tolerance: Patient tolerated treatment well   Patient is progressing with physical therapy. She demonstrated decreased right hip strength, but it is improved from the evaluation date. Will decrease patient's frequency to 1x/wk at this time.    Plan:  OP PT Plan  Treatment/Interventions: Education/ Instruction, Gait training, Manual therapy, Neuromuscular re-education, Therapeutic activities, Therapeutic exercises  PT Plan: Skilled PT  PT Frequency: 1 time per week  Duration: 6 weeks  Onset Date: 11/08/23  Certification Period Start Date: 01/05/24  Certification Period End Date: 03/29/24  Number of Treatments Authorized: 11 of 18  Rehab Potential: Good  Plan of Care Agreement: Patient    Current Problem:   1. Right hip pain  Follow Up In Physical Therapy    Follow Up In Physical Therapy          Subjective    General:  General  Reason for Referral: right hip pain s/p GLORY  Referred By: Dr. Davis  Past Medical History Relevant to Rehab: Right GLORY with small avulsion of greater trochanter on 11/8/2023  Patient states that her hip feels good. She states that her pain is in her low back.    Precautions:  Precautions  LE Weight Bearing Status: Weight Bearing as Tolerated  Post-Surgical Precautions: Right hip precautions    Pain:  Pain Assessment  Pain Assessment: 0-10  Pain Score: 5 - Moderate pain  Pain Location: Back  Pain Orientation: Lower    Objective     Extremity/Trunk Assessments:  Strength RLE  R Hip ABduction: 3-/5  R Knee Flexion: 4+/5  R Knee Extension: 4+/5     Outcome  "Measures:  Other Measures  Lower Extremity Funtional Score (LEFS): 60/80     Treatments:  Therapeutic Exercise  Therapeutic Exercise Performed: Yes  Therapeutic Exercise Activity 1: LAQ 4# x20  Therapeutic Exercise Activity 2: STS x20 @20\" height  Therapeutic Exercise Activity 3: standing hip ext/flex x 20 ea  Therapeutic Exercise Activity 4: heel raises x20  Therapeutic Exercise Activity 5: step ups fwd 6 inches BLE x 20  Therapeutic Exercise Activity 6: standing knee flex x20  Therapeutic Exercise Activity 8: Bridge x10  Therapeutic Exercise Activity 9: Nu Step Level 4 x 5' seat #7  Therapeutic Exercise Activity 12: standing hip flexor stretch at door x 10  Therapeutic Exercise Activity 14: standing hip abd x10 BLE  Therapeutic Exercise Activity 15: sidestepping in // 6'x4 R/L  Therapeutic Exercise Activity 16: step ups lateral 4\" x15 BLE    EDUCATION:  Outpatient Education  Individual(s) Educated: Patient  Education Provided: Home Exercise Program, POC  Patient/Caregiver Demonstrated Understanding: yes  Plan of Care Discussed and Agreed Upon: yes  Patient Response to Education: Patient/Caregiver Verbalized Understanding of Information    Goals:  Active       PT Problem       Patient will ambulate with normal gait pattern with no device. (Progressing)       Start:  01/05/24    Expected End:  02/16/24         Goal Note       Still using a single point cane.              Patient will ascend and descend 4-6 steps with rail modified independent reciprocal pattern. (Progressing)       Start:  01/05/24    Expected End:  02/16/24         Goal Note       Still using a step-to pattern.              Patient will achieve right hip flexion strength of at least 4+/5 for improved functional mobility.       Start:  01/05/24    Expected End:  02/16/24            Patient will achieve right hip abduction strength of at least 4+/5 for improved functional mobility.       Start:  01/05/24    Expected End:  02/16/24            Patient " will demonstrate independence in home program for support of progression       Start:  01/05/24    Expected End:  01/19/24            Patient will show a significant change in LEFS patient reported outcome tool to demonstrate subjective imporovement       Start:  01/05/24    Expected End:  02/16/24

## 2024-02-19 ENCOUNTER — TREATMENT (OUTPATIENT)
Dept: PHYSICAL THERAPY | Facility: HOSPITAL | Age: 79
End: 2024-02-19
Payer: MEDICARE

## 2024-02-19 DIAGNOSIS — M25.551 RIGHT HIP PAIN: ICD-10-CM

## 2024-02-19 PROCEDURE — 97110 THERAPEUTIC EXERCISES: CPT | Mod: GP,CQ

## 2024-02-19 ASSESSMENT — PAIN SCALES - GENERAL: PAINLEVEL_OUTOF10: 3

## 2024-02-19 ASSESSMENT — PAIN - FUNCTIONAL ASSESSMENT: PAIN_FUNCTIONAL_ASSESSMENT: 0-10

## 2024-02-19 NOTE — PROGRESS NOTES
Physical Therapy    Physical Therapy Treatment    Patient Name: Linda Van  MRN: [id number]  Today's Date: 2/19/2024  Time Calculation  Start Time: 1135  Stop Time: 1215  Time Calculation (min): 40 min      Assessment:Pt was able to perform side lying clamshell initiated today, in limited ROM d/t tendency to compensate with trunk rotation.    PT Assessment  PT Assessment Results: Decreased strength, Orthopedic restrictions, Impaired balance, Decreased range of motion, Decreased mobility  Rehab Prognosis: Good  Evaluation/Treatment Tolerance: Patient tolerated treatment well  Plan:Continue to progress current POC as tolerated to facilitate ability to perform functional activities.   OP PT Plan  PT Plan: Skilled PT  PT Frequency: 1 time per week  Duration: 6 weeks  Onset Date: 11/08/23  Certification Period Start Date: 01/05/24  Certification Period End Date: 03/29/24  Number of Treatments Authorized: 12 of 18    Current Problem  1. Right hip pain  Follow Up In Physical Therapy          General  PT  Visit  PT Received On: 02/19/24  Response to Previous Treatment: Patient with no complaints from previous session.  General  Reason for Referral: right hip pain s/p GLORY  Referred By: Dr. Davis  Past Medical History Relevant to Rehab: Right GLORY with small avulsion of greater trochanter on 11/8/2023    Subjective  Pt reports she tripped on her cane and fell this a.m.  Pt states she landed on the left side, which is sore.  Pt denies any pain in the R hip.    Precautions  Precautions  LE Weight Bearing Status: Weight Bearing as Tolerated  Post-Surgical Precautions: Right hip precautions  Vital Signs     Pain  Pain Assessment  Pain Assessment: 0-10  Pain Score: 3  Pain Location: Back  Pain Orientation: Lower    Objective   Cognition     Posture     Extremity/Trunk Assessment      Activity Tolerance:     Outcome Measures:    Treatments:  Therapeutic Exercise  Therapeutic Exercise Performed: Yes  Therapeutic Exercise Activity  "1: LAQ 4# x20  Therapeutic Exercise Activity 2: STS x20 @21\" height  Therapeutic Exercise Activity 3: standing hip ext yellow x 20 ea  Therapeutic Exercise Activity 5: step ups fwd 6 inches BLE x 20  Therapeutic Exercise Activity 7: clamshell x 20  Therapeutic Exercise Activity 8: Bridge x10  Therapeutic Exercise Activity 9: Bike level 3 x 5'  Therapeutic Exercise Activity 11: hamstring curls blue x 20  Therapeutic Exercise Activity 13: AirEx 1/2 tandem EC 45\" R/L PNF 2# x 10,  Therapeutic Exercise Activity 14: standing hip abd yellow x20 BLE  Therapeutic Exercise Activity 15: sidestepping in // 6'x4 R/L  Therapeutic Exercise Activity 16: step ups lateral 4\" x15 BLE         OP EDUCATION:  Outpatient Education  Individual(s) Educated: Patient  Education Provided: Home Exercise Program (clamshell)  Patient Response to Education: Patient/Caregiver Verbalized Understanding of Information    Goals:  Active       PT Problem       Patient will ambulate with normal gait pattern with no device. (Progressing)       Start:  01/05/24    Expected End:  02/16/24         Goal Note       Still using a single point cane.              Patient will ascend and descend 4-6 steps with rail modified independent reciprocal pattern. (Progressing)       Start:  01/05/24    Expected End:  02/16/24         Goal Note       Still using a step-to pattern.              Patient will achieve right hip flexion strength of at least 4+/5 for improved functional mobility.       Start:  01/05/24    Expected End:  02/16/24            Patient will achieve right hip abduction strength of at least 4+/5 for improved functional mobility.       Start:  01/05/24    Expected End:  02/16/24            Patient will demonstrate independence in home program for support of progression       Start:  01/05/24    Expected End:  01/19/24            Patient will show a significant change in LEFS patient reported outcome tool to demonstrate subjective imporovement       " Start:  01/05/24    Expected End:  02/16/24

## 2024-02-26 ENCOUNTER — TREATMENT (OUTPATIENT)
Dept: PHYSICAL THERAPY | Facility: HOSPITAL | Age: 79
End: 2024-02-26
Payer: MEDICARE

## 2024-02-26 DIAGNOSIS — M25.551 RIGHT HIP PAIN: Primary | ICD-10-CM

## 2024-02-26 PROCEDURE — 97110 THERAPEUTIC EXERCISES: CPT | Mod: GP,KX | Performed by: PHYSICAL THERAPIST

## 2024-02-26 ASSESSMENT — PAIN SCALES - GENERAL: PAINLEVEL_OUTOF10: 5 - MODERATE PAIN

## 2024-02-26 ASSESSMENT — PAIN - FUNCTIONAL ASSESSMENT: PAIN_FUNCTIONAL_ASSESSMENT: 0-10

## 2024-02-26 NOTE — PROGRESS NOTES
Physical Therapy    Physical Therapy Treatment    Patient Name: Linda Van  MRN: [id number]  Today's Date: 2/26/2024  Time Calculation  Start Time: 1002  Stop Time: 1042  Time Calculation (min): 40 min      Assessment:  PT Assessment  PT Assessment Results: Decreased strength, Orthopedic restrictions, Impaired balance, Decreased range of motion, Decreased mobility  Rehab Prognosis: Good  Evaluation/Treatment Tolerance: Patient tolerated treatment well  Patient demonstrated good tolerance to progression of exercises without complaints of increased pain.  Patient benefited from verbal cues for proper exercise techniques.    Plan:  OP PT Plan  Treatment/Interventions: Education/ Instruction, Gait training, Manual therapy, Neuromuscular re-education, Therapeutic activities, Therapeutic exercises  PT Plan: Skilled PT  PT Frequency: 1 time per week  Duration: 6 weeks  Onset Date: 11/08/23  Certification Period Start Date: 01/05/24  Certification Period End Date: 03/29/24  Number of Treatments Authorized: 13 of 18  Rehab Potential: Good  Plan of Care Agreement: Patient    Current Problem  1. Right hip pain  Follow Up In Physical Therapy          General  PT  Visit  PT Received On: 02/26/24  Response to Previous Treatment: Patient with no complaints from previous session.  General  Reason for Referral: right hip pain s/p GLORY  Referred By: Dr. Davis  Past Medical History Relevant to Rehab: Right GLORY with small avulsion of greater trochanter on 11/8/2023    Subjective    Precautions  Precautions  LE Weight Bearing Status: Weight Bearing as Tolerated  Post-Surgical Precautions: Right hip precautions  Patient states that she is still having right low back pain.    Pain  Pain Assessment  Pain Assessment: 0-10  Pain Score: 5 - Moderate pain  Pain Location: Back  Pain Orientation: Right, Lower    Objective     Treatments:  Therapeutic Exercise  Therapeutic Exercise Performed: Yes  Therapeutic Exercise Activity 1: LAQ 4#  "x20  Therapeutic Exercise Activity 2: STS x20 @20\" height  Therapeutic Exercise Activity 3: standing hip ext yellow x 20 ea  Therapeutic Exercise Activity 4: heel raises x20  Therapeutic Exercise Activity 5: step ups fwd 6\" BLE x 20  Therapeutic Exercise Activity 7: clamshell x 20  Therapeutic Exercise Activity 8: Bridge x15  Therapeutic Exercise Activity 9: Nu Step Level 4 x 5' seat #8  Therapeutic Exercise Activity 10: SLS toe down BLE 2x30\" each  Therapeutic Exercise Activity 11: hamstring curls blue x 20  Therapeutic Exercise Activity 14: standing hip abd yellow x20 BLE  Therapeutic Exercise Activity 16: step ups lateral 4\" x15 BLE         OP EDUCATION:  Outpatient Education  Individual(s) Educated: Patient  Education Provided: Home Exercise Program, POC  Patient/Caregiver Demonstrated Understanding: yes  Plan of Care Discussed and Agreed Upon: yes  Patient Response to Education: Patient/Caregiver Verbalized Understanding of Information    Goals:  Active       PT Problem       Patient will ambulate with normal gait pattern with no device. (Progressing)       Start:  01/05/24    Expected End:  02/16/24         Goal Note       Still using a single point cane.              Patient will ascend and descend 4-6 steps with rail modified independent reciprocal pattern. (Progressing)       Start:  01/05/24    Expected End:  02/16/24         Goal Note       Still using a step-to pattern.              Patient will achieve right hip flexion strength of at least 4+/5 for improved functional mobility.       Start:  01/05/24    Expected End:  02/16/24            Patient will achieve right hip abduction strength of at least 4+/5 for improved functional mobility.       Start:  01/05/24    Expected End:  02/16/24            Patient will demonstrate independence in home program for support of progression       Start:  01/05/24    Expected End:  01/19/24            Patient will show a significant change in LEFS patient reported " outcome tool to demonstrate subjective imporovement       Start:  01/05/24    Expected End:  02/16/24

## 2024-03-04 ENCOUNTER — TREATMENT (OUTPATIENT)
Dept: PHYSICAL THERAPY | Facility: HOSPITAL | Age: 79
End: 2024-03-04
Payer: MEDICARE

## 2024-03-04 DIAGNOSIS — M25.551 RIGHT HIP PAIN: ICD-10-CM

## 2024-03-04 PROCEDURE — 97110 THERAPEUTIC EXERCISES: CPT | Mod: GP,CQ

## 2024-03-04 PROCEDURE — 97140 MANUAL THERAPY 1/> REGIONS: CPT | Mod: GP,CQ

## 2024-03-04 ASSESSMENT — PAIN - FUNCTIONAL ASSESSMENT: PAIN_FUNCTIONAL_ASSESSMENT: 0-10

## 2024-03-04 ASSESSMENT — PAIN SCALES - GENERAL: PAINLEVEL_OUTOF10: 3

## 2024-03-04 NOTE — PROGRESS NOTES
"Physical Therapy    Physical Therapy Treatment    Patient Name: Linda Van  MRN: [id number]  Today's Date: 3/4/2024  Time Calculation  Start Time: 1002  Stop Time: 1042  Time Calculation (min): 40 min      Assessment:Pt reported decreased pain in the R posterior hip/LB after manual TPR to the gluteus min/med and TFL.  Pt required tactile cuing to correct positioning during SLS with opposite toe down on the R LE, with good follow through demonstrated.  PT Assessment  PT Assessment Results: Decreased strength, Orthopedic restrictions, Impaired balance, Decreased range of motion, Decreased mobility  Rehab Prognosis: Good  Plan:Continue to progress current POC as tolerated to facilitate ability to perform functional activities.   OP PT Plan  PT Plan: Skilled PT  PT Frequency: 1 time per week  Duration: 6 weeks  Onset Date: 11/08/23  Certification Period Start Date: 01/05/24  Certification Period End Date: 03/29/24  Number of Treatments Authorized: 14 of 18    Current Problem  1. Right hip pain  Follow Up In Physical Therapy          General  PT  Visit  PT Received On: 03/04/24  Response to Previous Treatment: Patient with no complaints from previous session.  General  Reason for Referral: right hip pain s/p GLORY  Referred By: Dr. Davis  Past Medical History Relevant to Rehab: Right GLORY with small avulsion of greater trochanter on 11/8/2023    Subjective  Pt states she was able to take a 1/2 mile walk yesterday without pain, just fatigue  Precautions  Precautions  LE Weight Bearing Status: Weight Bearing as Tolerated  Post-Surgical Precautions: Right hip precautions  Vital Signs     Pain  Pain Assessment  Pain Assessment: 0-10  Pain Score: 3  Pain Location: Back  Pain Orientation: Right    Objective   Cognition     Posture     Extremity/Trunk Assessment    Activity Tolerance:     Outcome Measures:    Treatments:  Therapeutic Exercise  Therapeutic Exercise Performed: Yes  Therapeutic Exercise Activity 2: STS x20 @20\" " "height  Therapeutic Exercise Activity 3: standing hip ext yellow x 20 ea  Therapeutic Exercise Activity 4: L Hamstrings stretch x2 30\"  Therapeutic Exercise Activity 6: R hip flexor stretch x10 5\"  Therapeutic Exercise Activity 8: Bridge with hip Adduction  x15  Therapeutic Exercise Activity 9: Bike Level 4 x5'  Therapeutic Exercise Activity 10: SLS toe down BLE 2x30\" each  Therapeutic Exercise Activity 12: hook lying hip abduction single green x10 R/L  Therapeutic Exercise Activity 14: standing hip abd yellow x20 BLE         OP EDUCATION:  Outpatient Education  Individual(s) Educated: Patient  Education Provided: Home Exercise Program (iso hip abd with band)  Patient Response to Education: Patient/Caregiver Verbalized Understanding of Information    Goals:  Active       PT Problem       Patient will ambulate with normal gait pattern with no device. (Progressing)       Start:  01/05/24    Expected End:  02/16/24         Goal Note       Still using a single point cane.              Patient will ascend and descend 4-6 steps with rail modified independent reciprocal pattern. (Progressing)       Start:  01/05/24    Expected End:  02/16/24         Goal Note       Still using a step-to pattern.              Patient will achieve right hip flexion strength of at least 4+/5 for improved functional mobility.       Start:  01/05/24    Expected End:  02/16/24            Patient will achieve right hip abduction strength of at least 4+/5 for improved functional mobility.       Start:  01/05/24    Expected End:  02/16/24            Patient will demonstrate independence in home program for support of progression       Start:  01/05/24    Expected End:  01/19/24            Patient will show a significant change in LEFS patient reported outcome tool to demonstrate subjective imporovement       Start:  01/05/24    Expected End:  02/16/24              "

## 2024-03-11 ENCOUNTER — TREATMENT (OUTPATIENT)
Dept: PHYSICAL THERAPY | Facility: HOSPITAL | Age: 79
End: 2024-03-11
Payer: MEDICARE

## 2024-03-11 DIAGNOSIS — M25.551 RIGHT HIP PAIN: Primary | ICD-10-CM

## 2024-03-11 PROCEDURE — 97110 THERAPEUTIC EXERCISES: CPT | Mod: GP | Performed by: PHYSICAL THERAPIST

## 2024-03-11 ASSESSMENT — PAIN - FUNCTIONAL ASSESSMENT: PAIN_FUNCTIONAL_ASSESSMENT: 0-10

## 2024-03-11 ASSESSMENT — PAIN SCALES - GENERAL: PAINLEVEL_OUTOF10: 0 - NO PAIN

## 2024-03-11 NOTE — PROGRESS NOTES
Physical Therapy    Physical Therapy Treatment    Patient Name: Linda Van  MRN: [id number]  Today's Date: 3/11/2024  Time Calculation  Start Time: 0959  Stop Time: 1042  Time Calculation (min): 43 min      Assessment:  PT Assessment  PT Assessment Results: Decreased strength, Orthopedic restrictions, Impaired balance, Decreased range of motion, Decreased mobility  Rehab Prognosis: Good  Evaluation/Treatment Tolerance: Patient tolerated treatment well  Patient demonstrated hip hike during standing hip abduction with yellow band, so yellow band was removed. Patient demonstrated difficulty lifting her right lower extremity onto the mat table. She continues to use her upper extremities to assist with her right lower extremity onto the mat table.    Plan:  OP PT Plan  Treatment/Interventions: Education/ Instruction, Gait training, Manual therapy, Neuromuscular re-education, Therapeutic activities, Therapeutic exercises  PT Plan: Skilled PT  PT Frequency: 1 time per week  Duration: 6 weeks  Onset Date: 11/08/23  Certification Period Start Date: 01/05/24  Certification Period End Date: 03/29/24  Number of Treatments Authorized: 15 of 18  Rehab Potential: Good  Plan of Care Agreement: Patient    Current Problem  1. Right hip pain  Follow Up In Physical Therapy          General  PT  Visit  PT Received On: 03/11/24  Response to Previous Treatment: Patient with no complaints from previous session.  General  Reason for Referral: right hip pain s/p GOLRY  Referred By: Dr. Davis  Past Medical History Relevant to Rehab: Right GLORY with small avulsion of greater trochanter on 11/8/2023    Subjective    Precautions  Precautions  LE Weight Bearing Status: Weight Bearing as Tolerated  Post-Surgical Precautions: Right hip precautions  Patient states that her hip feels good today.    Pain  Pain Assessment  Pain Assessment: 0-10  Pain Score: 0 - No pain  Pain Location: Back  Pain Orientation: Right    Objective  "    Treatments:  Therapeutic Exercise  Therapeutic Exercise Performed: Yes  Therapeutic Exercise Activity 1: LAQ 4# x20  Therapeutic Exercise Activity 2: STS x20 @20\" height  Therapeutic Exercise Activity 3: standing hip ext yellow x 20 ea  Therapeutic Exercise Activity 4: BLE Hamstrings stretch x2 30\"  Therapeutic Exercise Activity 5: step ups fwd 6\" BLE x 20  Therapeutic Exercise Activity 6: R hip flexor stretch x10 5\"  Therapeutic Exercise Activity 8: Bridge with hip Adduction  x15  Therapeutic Exercise Activity 9: Nu Step Level 4 x 5' seat #8  Therapeutic Exercise Activity 10: SLS toe down BLE 2x30\" each  Therapeutic Exercise Activity 12: hook lying hip abduction single green x20 R/L  Therapeutic Exercise Activity 14: standing hip abd x20 BLE  Therapeutic Exercise Activity 16: step ups lateral 4\" x20 BLE    OP EDUCATION:  Outpatient Education  Individual(s) Educated: Patient  Education Provided: Home Exercise Program (reviewed HEP)  Patient Response to Education: Patient/Caregiver Verbalized Understanding of Information    Goals:  Active       PT Problem       Patient will ambulate with normal gait pattern with no device. (Progressing)       Start:  01/05/24    Expected End:  02/16/24         Goal Note       Still using a single point cane.              Patient will ascend and descend 4-6 steps with rail modified independent reciprocal pattern. (Progressing)       Start:  01/05/24    Expected End:  02/16/24         Goal Note       Still using a step-to pattern.              Patient will achieve right hip flexion strength of at least 4+/5 for improved functional mobility.       Start:  01/05/24    Expected End:  02/16/24            Patient will achieve right hip abduction strength of at least 4+/5 for improved functional mobility.       Start:  01/05/24    Expected End:  02/16/24            Patient will demonstrate independence in home program for support of progression       Start:  01/05/24    Expected End:  " 01/19/24            Patient will show a significant change in LEFS patient reported outcome tool to demonstrate subjective imporovement       Start:  01/05/24    Expected End:  02/16/24

## 2024-03-18 ENCOUNTER — TREATMENT (OUTPATIENT)
Dept: PHYSICAL THERAPY | Facility: HOSPITAL | Age: 79
End: 2024-03-18
Payer: MEDICARE

## 2024-03-18 DIAGNOSIS — M25.551 RIGHT HIP PAIN: ICD-10-CM

## 2024-03-18 PROCEDURE — 97110 THERAPEUTIC EXERCISES: CPT | Mod: GP,CQ

## 2024-03-18 PROCEDURE — 97140 MANUAL THERAPY 1/> REGIONS: CPT | Mod: GP,CQ

## 2024-03-18 ASSESSMENT — PAIN - FUNCTIONAL ASSESSMENT: PAIN_FUNCTIONAL_ASSESSMENT: 0-10

## 2024-03-18 ASSESSMENT — PAIN SCALES - GENERAL: PAINLEVEL_OUTOF10: 4

## 2024-03-18 NOTE — PROGRESS NOTES
"  Physical Therapy Treatment    Patient Name: Linda Van  MRN: [id number]  Today's Date: 3/18/2024  Time Calculation  Start Time: 1000  Stop Time: 1040  Time Calculation (min): 40 min        PT Therapeutic Procedures Time Entry  Manual Therapy Time Entry: 8  Therapeutic Exercise Time Entry: 32             Current Problem  1. Right hip pain  Follow Up In Physical Therapy          General  Reason for Referral: right hip pain s/p GLORY  Referred By: Dr. Davis  Past Medical History Relevant to Rehab: Right GLORY with small avulsion of greater trochanter on 11/8/2023    Subjective   Current Condition:   Same  Patient reports her R hip is feeling good, however, continues to have R LBP.     Performing HEP?: Yes    Precautions     Pain  Pain Assessment: 0-10  Pain Score: 4  Pain Location: Back    Objective     Treatments:    Therapeutic Exercise  Therapeutic Exercise Performed: Yes  Therapeutic Exercise Activity 2: STS x20 @20\" height  Therapeutic Exercise Activity 4: Airex chops 4# x15  Therapeutic Exercise Activity 5: step ups fwd 6\" BLE x 20  Therapeutic Exercise Activity 6: R hip flexor stretch x10 5\"  Therapeutic Exercise Activity 7: Airex 1/2 tandem 30\" R/L, horizontal head movements 30\"  Therapeutic Exercise Activity 9: Bike Level 4 x5'  Therapeutic Exercise Activity 10: SLS toe down BLE 2x30\" each  Therapeutic Exercise Activity 13: retro step 45\" R/L  Therapeutic Exercise Activity 14: standing hip abd x20 BLE  Therapeutic Exercise Activity 15: Side stepping  Therapeutic Exercise Activity 16: step ups lateral 4\" x20 BLE         Manual Therapy  Manual Therapy Performed: Yes  Manual Therapy Activity 1: TPR R gluteus medius and TFL to decrease mm tension          EDUCATION:   Individual(s) Educated: Patient  Education Provided: Anatomy/soft tissue mobilization  Risk and Benefits Discussed with Patient/Caregiver/Other: Yes   Patient/Caregiver Demonstrated Understanding: Yes   Patient Response to Education: " Patient/Caregiver verbalized understanding of information    Assessment: Pt required tactile cuing to avoid compensatory trunk movements with standing hip exercises, with good follow through demonstrated.  Pt noted R LBP with step up exercise, which is chronic per pt. Pt was able to complete static and dynamic balance activities without LOB, with min/mod ankle strategy noted.         Plan: Continue with POC. Continue to progress current POC as tolerated to facilitate ability to perform functional activities.   OP PT Plan  PT Plan: Skilled PT  PT Frequency: 1 time per week  Duration: 6 weeks  Onset Date: 11/08/23  Certification Period Start Date: 01/05/24  Certification Period End Date: 03/29/24  Number of Treatments Authorized: 16 of 18    Goals:  Active       PT Problem       Patient will ambulate with normal gait pattern with no device. (Progressing)       Start:  01/05/24    Expected End:  02/16/24         Goal Note       Still using a single point cane.              Patient will ascend and descend 4-6 steps with rail modified independent reciprocal pattern. (Progressing)       Start:  01/05/24    Expected End:  02/16/24         Goal Note       Still using a step-to pattern.              Patient will achieve right hip flexion strength of at least 4+/5 for improved functional mobility.       Start:  01/05/24    Expected End:  02/16/24            Patient will achieve right hip abduction strength of at least 4+/5 for improved functional mobility.       Start:  01/05/24    Expected End:  02/16/24            Patient will demonstrate independence in home program for support of progression       Start:  01/05/24    Expected End:  01/19/24            Patient will show a significant change in LEFS patient reported outcome tool to demonstrate subjective imporovement       Start:  01/05/24    Expected End:  02/16/24                 Momo Grayson PTA

## 2024-03-22 DIAGNOSIS — E78.5 HYPERLIPIDEMIA, UNSPECIFIED HYPERLIPIDEMIA TYPE: ICD-10-CM

## 2024-03-22 DIAGNOSIS — E03.9 HYPOTHYROIDISM, UNSPECIFIED TYPE: ICD-10-CM

## 2024-03-22 DIAGNOSIS — I10 BENIGN ESSENTIAL HYPERTENSION: ICD-10-CM

## 2024-03-25 ENCOUNTER — TREATMENT (OUTPATIENT)
Dept: PHYSICAL THERAPY | Facility: HOSPITAL | Age: 79
End: 2024-03-25
Payer: MEDICARE

## 2024-03-25 DIAGNOSIS — M25.551 RIGHT HIP PAIN: Primary | ICD-10-CM

## 2024-03-25 PROCEDURE — 97110 THERAPEUTIC EXERCISES: CPT | Mod: GP | Performed by: PHYSICAL THERAPIST

## 2024-03-25 RX ORDER — LEVOTHYROXINE SODIUM 25 UG/1
TABLET ORAL
Qty: 90 TABLET | Refills: 0 | Status: SHIPPED | OUTPATIENT
Start: 2024-03-25

## 2024-03-25 RX ORDER — PRAVASTATIN SODIUM 40 MG/1
40 TABLET ORAL DAILY
Qty: 90 TABLET | Refills: 0 | Status: SHIPPED | OUTPATIENT
Start: 2024-03-25

## 2024-03-25 RX ORDER — AMLODIPINE BESYLATE 5 MG/1
5 TABLET ORAL DAILY
Qty: 90 TABLET | Refills: 0 | Status: SHIPPED | OUTPATIENT
Start: 2024-03-25

## 2024-03-25 RX ORDER — LOSARTAN POTASSIUM 100 MG/1
100 TABLET ORAL DAILY
Qty: 90 TABLET | Refills: 0 | Status: SHIPPED | OUTPATIENT
Start: 2024-03-25

## 2024-03-25 ASSESSMENT — PAIN SCALES - GENERAL: PAINLEVEL_OUTOF10: 4

## 2024-03-25 ASSESSMENT — PAIN - FUNCTIONAL ASSESSMENT: PAIN_FUNCTIONAL_ASSESSMENT: 0-10

## 2024-03-25 NOTE — PROGRESS NOTES
"Physical Therapy    Physical Therapy Treatment and Discharge       Patient Name: Linda Van  MRN: [id number]  Today's Date: 3/25/2024  Time Calculation  Start Time: 0955  Stop Time: 1038  Time Calculation (min): 43 min    Assessment:  PT Assessment  PT Assessment Results: Decreased strength, Orthopedic restrictions, Impaired balance, Decreased range of motion, Decreased mobility  Rehab Prognosis: Good  Evaluation/Treatment Tolerance: Patient tolerated treatment well   Patient has partially met her physical therapy goals. She continues to demonstrate impaired hip strength, but it is improving. Patient is appropriate to discharge from physical therapy at this time. Patient agrees with plan to continue her home exercise program.    Plan:  OP PT Plan  Treatment/Interventions: Education/ Instruction, Gait training, Manual therapy, Neuromuscular re-education, Therapeutic activities, Therapeutic exercises  PT Plan: Skilled PT  PT Frequency: 1 time per week  Duration: 6 weeks  Onset Date: 11/08/23  Certification Period Start Date: 01/05/24  Certification Period End Date: 03/29/24  Number of Treatments Authorized: 17 of 18  Rehab Potential: Good  Plan of Care Agreement: Patient    Current Problem:   1. Right hip pain  Follow Up In Physical Therapy          Subjective    General:  General  Reason for Referral: right hip pain s/p GLORY  Referred By: Dr. Davis  Past Medical History Relevant to Rehab: Right GLORY with small avulsion of greater trochanter on 11/8/2023  Patient states that she continues to have \"catching\" in her low back/hip.    Precautions:  Precautions  LE Weight Bearing Status: Weight Bearing as Tolerated    Pain:  Pain Assessment  Pain Assessment: 0-10  Pain Score: 4  Pain Location: Back  Pain Orientation: Right, Lower    Objective     Extremity/Trunk Assessments:  Strength RLE  R Hip Flexion: 4+/5  R Hip ABduction: 3/5  R Knee Flexion: 5/5  R Knee Extension: 5/5    Outcome Measures:  Other Measures  Lower " "Extremity Funtional Score (LEFS): 48/80     Treatments:  Therapeutic Exercise  Therapeutic Exercise Performed: Yes  Therapeutic Exercise Activity 2: STS x20 @20\" height  Therapeutic Exercise Activity 4: Airex chops 4# x15  Therapeutic Exercise Activity 5: step ups fwd 6\" BLE x 20  Therapeutic Exercise Activity 7: Airex 1/2 tandem 30\" R/L, horizontal head movements 30\"  Therapeutic Exercise Activity 9: Nu Step Level 4 x 5' seat #8  Therapeutic Exercise Activity 10: SLS toe down BLE 2x30\" each  Therapeutic Exercise Activity 14: standing hip abd x20 BLE  Therapeutic Exercise Activity 16: step ups lateral 4\" x20 BLE     EDUCATION:  Outpatient Education  Individual(s) Educated: Patient  Education Provided: Home Exercise Program  Patient Response to Education: Patient/Caregiver Verbalized Understanding of Information    Goals:  Active       PT Problem       Patient will ambulate with normal gait pattern with no device. (Progressing)       Start:  01/05/24    Expected End:  02/16/24         Goal Note       Walks without single point cane around the kitchen, but demonstrates a Trendelenburg gait pattern with and without the single point cane.              Patient will ascend and descend 4-6 steps with rail modified independent reciprocal pattern. (Progressing)       Start:  01/05/24    Expected End:  02/16/24         Goal Note       Continues to use a step-to pattern.              Patient will achieve right hip flexion strength of at least 4+/5 for improved functional mobility. (Met)       Start:  01/05/24    Expected End:  02/16/24    Resolved:  03/25/24         Patient will achieve right hip abduction strength of at least 4+/5 for improved functional mobility. (Progressing)       Start:  01/05/24    Expected End:  02/16/24            Patient will demonstrate independence in home program for support of progression (Met)       Start:  01/05/24    Expected End:  01/19/24    Resolved:  03/25/24         Patient will show a " significant change in LEFS patient reported outcome tool to demonstrate subjective imporovement (Not Progressing)       Start:  01/05/24    Expected End:  02/16/24         Goal Note       Lower LEFS score 3/25/2024 compared to last recheck.

## 2024-04-26 ASSESSMENT — ENCOUNTER SYMPTOMS
WHEEZING: 0
CHEST TIGHTNESS: 0
SORE THROAT: 0
ENDOCRINE NEGATIVE: 1
CONSTIPATION: 0
COUGH: 0
DIZZINESS: 0
SINUS PAIN: 0
SHORTNESS OF BREATH: 0
PSYCHIATRIC NEGATIVE: 1
ALLERGIC/IMMUNOLOGIC NEGATIVE: 1
HEMATOLOGIC/LYMPHATIC NEGATIVE: 1
PALPITATIONS: 0
HEADACHES: 0
EYES NEGATIVE: 1
FEVER: 0
WEAKNESS: 0
CHILLS: 0
DIARRHEA: 0
LIGHT-HEADEDNESS: 0
SINUS PRESSURE: 0
NAUSEA: 0
ARTHRALGIAS: 1
VOMITING: 0
ABDOMINAL DISTENTION: 0
NUMBNESS: 0
RHINORRHEA: 0
BACK PAIN: 1
FATIGUE: 0
ACTIVITY CHANGE: 0
ABDOMINAL PAIN: 0

## 2024-04-26 NOTE — PROGRESS NOTES
Subjective   Reason for Visit: Linda Van is an 79 y.o. female here for a Medicare Wellness visit.     Past Medical, Surgical, and Family History reviewed and updated in chart.    Reviewed all medications by prescribing practitioner or clinical pharmacist (such as prescriptions, OTCs, herbal therapies and supplements) and documented in the medical record.    Medicare physical  S/P right hip replacement 2023 with Dr. Davis  PAF, rate controlled, following with cardiology. Taking metoprolol. Taking Eliquis for stroke prevention.   Hypertension, controlled.  Taking amlodipine 5 mg daily, losartan 100 mg daily and metoprolol 50 mg twice a day.  Hyperlipidemia, controlled, taking pravastatin 40 mg daily.  Hypothyroidism, taking levothyroxine 25 mcg daily  Urology, follows with Dr. Ambriz for incontinence  Recommend pneumonia vaccine and Tdap  Due for complete labs  I spent 15 minutes obtaining and discussing depression screening using PHQ-2 questions with results documented in the chart.   I spent greater than 15 minutes face-to-face with individual providing recommendations for nutrition choices and exercise plan to help achieve weight reduction.    Preventive:  CRC screen: Declines  Mammogram: 2019, declines  DEXA scan: 2019, declines  PAP:  CT cardiac scorin2023 score 1.91        The 10-year ASCVD risk score (Kevin DK, et al., 2019) is: 32.8%    Values used to calculate the score:      Age: 79 years      Sex: Female      Is Non- : No      Diabetic: No      Tobacco smoker: No      Systolic Blood Pressure: 128 mmHg      Is BP treated: Yes      HDL Cholesterol: 72 mg/dL      Total Cholesterol: 157 mg/dL    Patient Care Team:  BRENDON Wagner-CNP, DNP as PCP - General     No visits with results within 3 Month(s) from this visit.   Latest known visit with results is:   Lab on 2023   Component Date Value Ref Range Status    Glucose 2023 101 (H)  74 - 99 mg/dL  Final    Sodium 11/01/2023 140  136 - 145 mmol/L Final    Potassium 11/01/2023 4.4  3.5 - 5.3 mmol/L Final    Chloride 11/01/2023 105  98 - 107 mmol/L Final    Bicarbonate 11/01/2023 28  21 - 32 mmol/L Final    Anion Gap 11/01/2023 11  10 - 20 mmol/L Final    Urea Nitrogen 11/01/2023 13  6 - 23 mg/dL Final    Creatinine 11/01/2023 0.70  0.50 - 1.05 mg/dL Final    eGFR 11/01/2023 89  >60 mL/min/1.73m*2 Final    Calculations of estimated GFR are performed using the 2021 CKD-EPI Study Refit equation without the race variable for the IDMS-Traceable creatinine methods.  https://jasn.asnjournals.org/content/early/2021/09/22/ASN.4133927131    Calcium 11/01/2023 9.3  8.6 - 10.3 mg/dL Final    Magnesium 11/01/2023 2.06  1.60 - 2.40 mg/dL Final       CT cardiac scoring wo IV contrast  Narrative: Interpreted By:  TOM KNAPP MD  MRN: [id number]  Patient Name: JHON MORROW     STUDY:  CT CARDIAC SCORING;  8/17/2023 3:45 pm     INDICATION:  a fib.     COMPARISON:  07/14/2023 thoracic CTA.     ACCESSION NUMBER(S):  [id number]     ORDERING CLINICIAN:  LINDA RENTERIA     TECHNIQUE:  Using prospective ECG gating, CT scan of the coronary arteries was  performed without intravenous contrast. Coronary calcium scoring  was  performed according to the method of Agatston.     FINDINGS:  The score and distribution of calcium in the coronary arteries is as  follows:     LM 0,  LAD 1.91,  LCx 0,  RCA 0,     Total 1.91     The visualized mid/lower ascending thoracic aorta measures 3.5 cm in  diameter. Aortic calcification is noted. The heart is normal in size.  No pericardial effusion is present.     No gross mediastinal or hilar lymphadenopathy or mass is identified.  A tiny hiatal hernia is questioned. The visualized segments of the  lungs are essentially clear.     The visualized subdiaphragmatic structures appear normal. The   image shows multilevel spinal degenerative changes and right upper  quadrant abdominal  "surgical clips.     Impression: 1. Coronary artery calcium score of 1.91*.     *Coronary artery calcium scoring may be helpful in predicting the  risk for future coronary heart disease events.  According to the  American College of Cardiology Foundation Clinical Expert Consensus  Task Force, such testing provides important prognostic information in  patients with more than one coronary heart disease risk factor. The  coronary artery calcium score correlates with the annual risk of a  non-fatal myocardial infarction or coronary heart disease death.     Coronary artery score            Annual Risk     0-99                             0.4%  100-399                        1.3%  >400                            2.4%     These three \"breakpoints\" correspond to lower, intermediate and high  risk states for future coronary events.  Such information should be  used, along with appropriate clinical judgment, to make decisions  regarding the intensity of risk factor management strategies to treat  blood lipids and to modify other non-lipid coronary risk factors.     Reference: Charles P et al. Circulation.  2007; 115:402-426     MACRO:  None       Review of Systems   Constitutional:  Negative for activity change, chills, fatigue and fever.   HENT:  Negative for congestion, rhinorrhea, sinus pressure, sinus pain and sore throat.    Eyes: Negative.    Respiratory:  Negative for cough, chest tightness, shortness of breath and wheezing.    Cardiovascular:  Negative for chest pain, palpitations and leg swelling.   Gastrointestinal:  Negative for abdominal distention, abdominal pain, constipation, diarrhea, nausea and vomiting.   Endocrine: Negative.    Genitourinary: Negative.    Musculoskeletal:  Positive for arthralgias and back pain.        Using cane   Skin: Negative.    Allergic/Immunologic: Negative.    Neurological:  Negative for dizziness, syncope, weakness, light-headedness, numbness and headaches.   Hematological: " "Negative.    Psychiatric/Behavioral: Negative.     All other systems reviewed and are negative.      Objective   Vitals:  /78   Pulse 54   Temp 35.9 °C (96.6 °F)   Ht 1.575 m (5' 2\")   Wt 81.6 kg (180 lb)   SpO2 98%   BMI 32.92 kg/m²       Physical Exam  Vitals and nursing note reviewed.   Constitutional:       General: She is not in acute distress.     Appearance: Normal appearance. She is obese. She is not ill-appearing.   HENT:      Head: Normocephalic and atraumatic.      Right Ear: Tympanic membrane, ear canal and external ear normal.      Left Ear: Tympanic membrane, ear canal and external ear normal.      Nose: Nose normal.      Mouth/Throat:      Mouth: Mucous membranes are moist.      Pharynx: Oropharynx is clear.   Eyes:      Pupils: Pupils are equal, round, and reactive to light.   Cardiovascular:      Rate and Rhythm: Normal rate and regular rhythm.      Pulses: Normal pulses.      Heart sounds: Normal heart sounds. No murmur heard.  Pulmonary:      Effort: Pulmonary effort is normal. No respiratory distress.      Breath sounds: Normal breath sounds. No wheezing.   Abdominal:      General: Bowel sounds are normal. There is no distension.      Palpations: Abdomen is soft.      Tenderness: There is no abdominal tenderness.   Musculoskeletal:         General: No tenderness. Normal range of motion.      Cervical back: Normal range of motion and neck supple.      Right lower leg: No edema.      Left lower leg: No edema.      Comments: Using cane   Skin:     General: Skin is warm and dry.      Capillary Refill: Capillary refill takes less than 2 seconds.      Coloration: Skin is not jaundiced.   Neurological:      General: No focal deficit present.      Mental Status: She is alert and oriented to person, place, and time.      Motor: No weakness.   Psychiatric:         Mood and Affect: Mood normal.         Behavior: Behavior normal.         Thought Content: Thought content normal.         Judgment: " Judgment normal.         Assessment/Plan    Linda was seen today for medicare annual wellness visit subsequent and had hip replacement surgery 11/8/23.  Diagnoses and all orders for this visit:  Routine general medical examination at health care facility (Primary)  Class 1 obesity with serious comorbidity and body mass index (BMI) of 32.0 to 32.9 in adult, unspecified obesity type  Vitamin D deficiency  -     Vitamin D 25-Hydroxy,Total (for eval of Vitamin D levels); Future  Mixed hyperlipidemia  -     Lipid Panel; Future  Hypothyroidism, unspecified type  -     TSH with reflex to Free T4 if abnormal; Future  Benign essential hypertension  -     CBC and Auto Differential; Future  -     Comprehensive Metabolic Panel; Future  -     Vitamin B12; Future  Other orders  -     Follow Up In Primary Care - Medicare Annual  -     Follow Up In Primary Care - Established; Future     # PAF  -Follow with Dr. Stinson  -Continue metoprolol 50 mg twice a day  -Continue Eliquis 5 mg every 12 hours for stroke prevention  # Hypertension, controlled  -Continue amlodipine 5 mg daily, losartan 100 mg daily, metoprolol 50 mg twice a day  -Low fat/cholesterol, low sodium, low carbohydrate diet  -Exercise as tolerated 150 minutes/week, increase activity slowly  -Weight loss  # Hyperlipidemia  -Continue pravastatin 40 mg daily  -Low fat/low cholesterol diet  -Eat more fresh foods  -Avoid processed/prepackaged/fast foods  -Gradually increase physical activity  -Weight loss  # Hypothyroidism, controlled  -Continue levothyroxine 25 mg daily  -Monitor TSH  # Obesity, BMI 32  -Avoid sweets and sugary drinks  -Drink plenty of water  -Avoid processed foods and refined foods  -Eat fruits, vegetables, lean protein, whole grains  -Increase your activity level  # R hip arthritis  -S/P RTHA 11/2023  -Follow-up with Ortho    Follow-up 6 months and as needed  Patient was identified as a fall risk. Risk prevention instructions provided.

## 2024-05-02 ENCOUNTER — LAB (OUTPATIENT)
Dept: LAB | Facility: LAB | Age: 79
End: 2024-05-02
Payer: MEDICARE

## 2024-05-02 ENCOUNTER — OFFICE VISIT (OUTPATIENT)
Dept: PRIMARY CARE | Facility: CLINIC | Age: 79
End: 2024-05-02
Payer: MEDICARE

## 2024-05-02 VITALS
SYSTOLIC BLOOD PRESSURE: 128 MMHG | WEIGHT: 180 LBS | HEART RATE: 54 BPM | BODY MASS INDEX: 33.13 KG/M2 | DIASTOLIC BLOOD PRESSURE: 78 MMHG | OXYGEN SATURATION: 98 % | HEIGHT: 62 IN | TEMPERATURE: 96.6 F

## 2024-05-02 DIAGNOSIS — Z13.31 DEPRESSION SCREENING: ICD-10-CM

## 2024-05-02 DIAGNOSIS — E78.2 MIXED HYPERLIPIDEMIA: ICD-10-CM

## 2024-05-02 DIAGNOSIS — I10 BENIGN ESSENTIAL HYPERTENSION: ICD-10-CM

## 2024-05-02 DIAGNOSIS — E55.9 VITAMIN D DEFICIENCY: ICD-10-CM

## 2024-05-02 DIAGNOSIS — Z71.89 ADVANCED DIRECTIVES, COUNSELING/DISCUSSION: ICD-10-CM

## 2024-05-02 DIAGNOSIS — E03.9 HYPOTHYROIDISM, UNSPECIFIED TYPE: ICD-10-CM

## 2024-05-02 DIAGNOSIS — I48.91 ATRIAL FIBRILLATION AND FLUTTER (MULTI): ICD-10-CM

## 2024-05-02 DIAGNOSIS — Z00.00 ROUTINE GENERAL MEDICAL EXAMINATION AT HEALTH CARE FACILITY: Primary | ICD-10-CM

## 2024-05-02 DIAGNOSIS — I48.92 ATRIAL FIBRILLATION AND FLUTTER (MULTI): ICD-10-CM

## 2024-05-02 DIAGNOSIS — M19.90 ARTHRITIS: ICD-10-CM

## 2024-05-02 DIAGNOSIS — E66.9 CLASS 1 OBESITY WITH SERIOUS COMORBIDITY AND BODY MASS INDEX (BMI) OF 32.0 TO 32.9 IN ADULT, UNSPECIFIED OBESITY TYPE: ICD-10-CM

## 2024-05-02 PROBLEM — E66.811 CLASS 1 OBESITY WITH SERIOUS COMORBIDITY AND BODY MASS INDEX (BMI) OF 31.0 TO 31.9 IN ADULT: Status: RESOLVED | Noted: 2023-05-01 | Resolved: 2024-05-02

## 2024-05-02 LAB
ALBUMIN SERPL BCP-MCNC: 4.2 G/DL (ref 3.4–5)
ALP SERPL-CCNC: 68 U/L (ref 33–136)
ALT SERPL W P-5'-P-CCNC: 12 U/L (ref 7–45)
ANION GAP SERPL CALC-SCNC: 10 MMOL/L (ref 10–20)
AST SERPL W P-5'-P-CCNC: 18 U/L (ref 9–39)
BASOPHILS # BLD AUTO: 0.09 X10*3/UL (ref 0–0.1)
BASOPHILS NFR BLD AUTO: 1.2 %
BILIRUB SERPL-MCNC: 1 MG/DL (ref 0–1.2)
BUN SERPL-MCNC: 12 MG/DL (ref 6–23)
CALCIUM SERPL-MCNC: 9.7 MG/DL (ref 8.6–10.3)
CHLORIDE SERPL-SCNC: 106 MMOL/L (ref 98–107)
CHOLEST SERPL-MCNC: 188 MG/DL (ref 0–199)
CHOLESTEROL/HDL RATIO: 2.5
CO2 SERPL-SCNC: 28 MMOL/L (ref 21–32)
CREAT SERPL-MCNC: 0.77 MG/DL (ref 0.5–1.05)
EGFRCR SERPLBLD CKD-EPI 2021: 79 ML/MIN/1.73M*2
EOSINOPHIL # BLD AUTO: 0.16 X10*3/UL (ref 0–0.4)
EOSINOPHIL NFR BLD AUTO: 2 %
ERYTHROCYTE [DISTWIDTH] IN BLOOD BY AUTOMATED COUNT: 14.1 % (ref 11.5–14.5)
GLUCOSE SERPL-MCNC: 99 MG/DL (ref 74–99)
HCT VFR BLD AUTO: 41.1 % (ref 36–46)
HDLC SERPL-MCNC: 74.8 MG/DL
HGB BLD-MCNC: 13.4 G/DL (ref 12–16)
IMM GRANULOCYTES # BLD AUTO: 0.02 X10*3/UL (ref 0–0.5)
IMM GRANULOCYTES NFR BLD AUTO: 0.3 % (ref 0–0.9)
LDLC SERPL CALC-MCNC: 82 MG/DL
LYMPHOCYTES # BLD AUTO: 2.76 X10*3/UL (ref 0.8–3)
LYMPHOCYTES NFR BLD AUTO: 35.3 %
MCH RBC QN AUTO: 29.5 PG (ref 26–34)
MCHC RBC AUTO-ENTMCNC: 32.6 G/DL (ref 32–36)
MCV RBC AUTO: 90 FL (ref 80–100)
MONOCYTES # BLD AUTO: 0.63 X10*3/UL (ref 0.05–0.8)
MONOCYTES NFR BLD AUTO: 8.1 %
NEUTROPHILS # BLD AUTO: 4.16 X10*3/UL (ref 1.6–5.5)
NEUTROPHILS NFR BLD AUTO: 53.1 %
NON HDL CHOLESTEROL: 113 MG/DL (ref 0–149)
NRBC BLD-RTO: 0 /100 WBCS (ref 0–0)
PLATELET # BLD AUTO: 259 X10*3/UL (ref 150–450)
POTASSIUM SERPL-SCNC: 4 MMOL/L (ref 3.5–5.3)
PROT SERPL-MCNC: 7.1 G/DL (ref 6.4–8.2)
RBC # BLD AUTO: 4.55 X10*6/UL (ref 4–5.2)
SODIUM SERPL-SCNC: 140 MMOL/L (ref 136–145)
TRIGL SERPL-MCNC: 158 MG/DL (ref 0–149)
TSH SERPL-ACNC: 1.97 MIU/L (ref 0.44–3.98)
VLDL: 32 MG/DL (ref 0–40)
WBC # BLD AUTO: 7.8 X10*3/UL (ref 4.4–11.3)

## 2024-05-02 PROCEDURE — 1170F FXNL STATUS ASSESSED: CPT | Performed by: NURSE PRACTITIONER

## 2024-05-02 PROCEDURE — 3074F SYST BP LT 130 MM HG: CPT | Performed by: NURSE PRACTITIONER

## 2024-05-02 PROCEDURE — 82607 VITAMIN B-12: CPT

## 2024-05-02 PROCEDURE — 1159F MED LIST DOCD IN RCRD: CPT | Performed by: NURSE PRACTITIONER

## 2024-05-02 PROCEDURE — G0439 PPPS, SUBSEQ VISIT: HCPCS | Performed by: NURSE PRACTITIONER

## 2024-05-02 PROCEDURE — 1036F TOBACCO NON-USER: CPT | Performed by: NURSE PRACTITIONER

## 2024-05-02 PROCEDURE — 1158F ADVNC CARE PLAN TLK DOCD: CPT | Performed by: NURSE PRACTITIONER

## 2024-05-02 PROCEDURE — G0447 BEHAVIOR COUNSEL OBESITY 15M: HCPCS | Performed by: NURSE PRACTITIONER

## 2024-05-02 PROCEDURE — 3078F DIAST BP <80 MM HG: CPT | Performed by: NURSE PRACTITIONER

## 2024-05-02 PROCEDURE — 1123F ACP DISCUSS/DSCN MKR DOCD: CPT | Performed by: NURSE PRACTITIONER

## 2024-05-02 PROCEDURE — 99214 OFFICE O/P EST MOD 30 MIN: CPT | Performed by: NURSE PRACTITIONER

## 2024-05-02 PROCEDURE — 82306 VITAMIN D 25 HYDROXY: CPT

## 2024-05-02 PROCEDURE — 1157F ADVNC CARE PLAN IN RCRD: CPT | Performed by: NURSE PRACTITIONER

## 2024-05-02 PROCEDURE — 1160F RVW MEDS BY RX/DR IN RCRD: CPT | Performed by: NURSE PRACTITIONER

## 2024-05-02 PROCEDURE — G0444 DEPRESSION SCREEN ANNUAL: HCPCS | Performed by: NURSE PRACTITIONER

## 2024-05-02 PROCEDURE — 36415 COLL VENOUS BLD VENIPUNCTURE: CPT

## 2024-05-02 ASSESSMENT — ACTIVITIES OF DAILY LIVING (ADL)
TAKING_MEDICATION: INDEPENDENT
DRESSING: INDEPENDENT
BATHING: INDEPENDENT
MANAGING_FINANCES: INDEPENDENT
GROCERY_SHOPPING: INDEPENDENT
DOING_HOUSEWORK: INDEPENDENT

## 2024-05-02 ASSESSMENT — PATIENT HEALTH QUESTIONNAIRE - PHQ9
1. LITTLE INTEREST OR PLEASURE IN DOING THINGS: NOT AT ALL
2. FEELING DOWN, DEPRESSED OR HOPELESS: NOT AT ALL
SUM OF ALL RESPONSES TO PHQ9 QUESTIONS 1 AND 2: 0

## 2024-05-02 ASSESSMENT — ENCOUNTER SYMPTOMS
OCCASIONAL FEELINGS OF UNSTEADINESS: 1
DEPRESSION: 0
LOSS OF SENSATION IN FEET: 0

## 2024-05-02 NOTE — PATIENT INSTRUCTIONS
Recommend pneumonia vaccine and tetanus booster        Ways to Help Prevent Falls at Home    Quick Tips   ? Ask for help if you need it. Most people want to help!   ? Get up slowly after sitting or laying down   ? Wear a medical alert device or keep cell phone in your pocket   ? Use night lights, especially areas near a bathroom   ? Keep the items you use often within reach on a small stool or end table   ? Use an assistive device such as walker or cane, as directed by provider/physical therapy   ? Use a non-slip mat and grab bars in your bathroom. Look for home health sections for best options     Other Areas to Focus On   ? Exercise and nutrition: Regular exercise or taking a falls prevention class are great ways improve strength and balance. Don’t forget to stay hydrated and bring a snack!   ? Medicine side effects: Some medicines can make you sleepy or dizzy, which could cause a fall. Ask your healthcare provider about the side effects your medicines could cause. Be sure to let them know if you take any vitamins or supplements as well.   ? Tripping hazards: Remove items you could trip on, such as loose mats, rugs, cords, and clutter. Wear closed toe shoes with rubber soles.   ? Health and wellness: Get regular checkups with your healthcare provider, plus routine vision and hearing screenings. Talk with your healthcare provider about:   o Your medicines and the possible side effects - bring them in a bag if that is easier!   o Problems with balance or feeling dizzy   o Ways to promote bone health, such as Vitamin D and calcium supplements   o Questions or concerns about falling     *Ask your healthcare team if you have questions     [facility], 2022

## 2024-05-03 DIAGNOSIS — E55.9 VITAMIN D DEFICIENCY: Primary | ICD-10-CM

## 2024-05-03 LAB
25(OH)D3 SERPL-MCNC: 26 NG/ML (ref 30–100)
VIT B12 SERPL-MCNC: 296 PG/ML (ref 211–911)

## 2024-05-03 RX ORDER — CHOLECALCIFEROL (VITAMIN D3) 125 MCG
125 CAPSULE ORAL DAILY
Qty: 90 CAPSULE | Refills: 1 | Status: SHIPPED | OUTPATIENT
Start: 2024-05-03

## 2024-06-18 DIAGNOSIS — E03.9 HYPOTHYROIDISM, UNSPECIFIED TYPE: ICD-10-CM

## 2024-06-18 DIAGNOSIS — E78.5 HYPERLIPIDEMIA, UNSPECIFIED HYPERLIPIDEMIA TYPE: ICD-10-CM

## 2024-06-18 DIAGNOSIS — I10 BENIGN ESSENTIAL HYPERTENSION: ICD-10-CM

## 2024-06-18 RX ORDER — PRAVASTATIN SODIUM 40 MG/1
40 TABLET ORAL DAILY
Qty: 90 TABLET | Refills: 1 | Status: SHIPPED | OUTPATIENT
Start: 2024-06-18

## 2024-06-18 RX ORDER — AMLODIPINE BESYLATE 5 MG/1
5 TABLET ORAL DAILY
Qty: 90 TABLET | Refills: 1 | Status: SHIPPED | OUTPATIENT
Start: 2024-06-18

## 2024-06-18 RX ORDER — LEVOTHYROXINE SODIUM 25 UG/1
TABLET ORAL
Qty: 90 TABLET | Refills: 1 | Status: SHIPPED | OUTPATIENT
Start: 2024-06-18

## 2024-06-18 RX ORDER — LOSARTAN POTASSIUM 100 MG/1
100 TABLET ORAL DAILY
Qty: 90 TABLET | Refills: 1 | Status: SHIPPED | OUTPATIENT
Start: 2024-06-18

## 2024-11-04 ENCOUNTER — APPOINTMENT (OUTPATIENT)
Dept: PRIMARY CARE | Facility: CLINIC | Age: 79
End: 2024-11-04
Payer: MEDICARE

## 2024-11-05 ENCOUNTER — APPOINTMENT (OUTPATIENT)
Dept: PRIMARY CARE | Facility: CLINIC | Age: 79
End: 2024-11-05
Payer: MEDICARE

## 2024-11-06 ENCOUNTER — APPOINTMENT (OUTPATIENT)
Dept: PRIMARY CARE | Facility: CLINIC | Age: 79
End: 2024-11-06
Payer: MEDICARE

## 2024-11-06 VITALS
TEMPERATURE: 97.3 F | SYSTOLIC BLOOD PRESSURE: 140 MMHG | OXYGEN SATURATION: 98 % | DIASTOLIC BLOOD PRESSURE: 70 MMHG | BODY MASS INDEX: 33.98 KG/M2 | HEART RATE: 50 BPM | WEIGHT: 185.8 LBS

## 2024-11-06 DIAGNOSIS — I10 BENIGN ESSENTIAL HYPERTENSION: Primary | ICD-10-CM

## 2024-11-06 DIAGNOSIS — E66.811 CLASS 1 OBESITY DUE TO EXCESS CALORIES WITH SERIOUS COMORBIDITY AND BODY MASS INDEX (BMI) OF 33.0 TO 33.9 IN ADULT: ICD-10-CM

## 2024-11-06 DIAGNOSIS — I48.0 PAROXYSMAL ATRIAL FIBRILLATION (MULTI): ICD-10-CM

## 2024-11-06 DIAGNOSIS — E78.5 HYPERLIPIDEMIA, UNSPECIFIED HYPERLIPIDEMIA TYPE: ICD-10-CM

## 2024-11-06 DIAGNOSIS — M19.90 ARTHRITIS: ICD-10-CM

## 2024-11-06 DIAGNOSIS — E66.09 CLASS 1 OBESITY DUE TO EXCESS CALORIES WITH SERIOUS COMORBIDITY AND BODY MASS INDEX (BMI) OF 33.0 TO 33.9 IN ADULT: ICD-10-CM

## 2024-11-06 DIAGNOSIS — E03.9 HYPOTHYROIDISM, UNSPECIFIED TYPE: ICD-10-CM

## 2024-11-06 PROCEDURE — 3077F SYST BP >= 140 MM HG: CPT | Performed by: INTERNAL MEDICINE

## 2024-11-06 PROCEDURE — G2211 COMPLEX E/M VISIT ADD ON: HCPCS | Performed by: INTERNAL MEDICINE

## 2024-11-06 PROCEDURE — 1159F MED LIST DOCD IN RCRD: CPT | Performed by: INTERNAL MEDICINE

## 2024-11-06 PROCEDURE — 1160F RVW MEDS BY RX/DR IN RCRD: CPT | Performed by: INTERNAL MEDICINE

## 2024-11-06 PROCEDURE — 1036F TOBACCO NON-USER: CPT | Performed by: INTERNAL MEDICINE

## 2024-11-06 PROCEDURE — 1157F ADVNC CARE PLAN IN RCRD: CPT | Performed by: INTERNAL MEDICINE

## 2024-11-06 PROCEDURE — 99214 OFFICE O/P EST MOD 30 MIN: CPT | Performed by: INTERNAL MEDICINE

## 2024-11-06 PROCEDURE — 3078F DIAST BP <80 MM HG: CPT | Performed by: INTERNAL MEDICINE

## 2024-11-06 RX ORDER — PRAVASTATIN SODIUM 40 MG/1
40 TABLET ORAL DAILY
Qty: 90 TABLET | Refills: 1 | Status: SHIPPED | OUTPATIENT
Start: 2024-11-06

## 2024-11-06 RX ORDER — LEVOTHYROXINE SODIUM 25 UG/1
TABLET ORAL
Qty: 90 TABLET | Refills: 1 | Status: SHIPPED | OUTPATIENT
Start: 2024-11-06

## 2024-11-06 RX ORDER — AMLODIPINE BESYLATE 5 MG/1
5 TABLET ORAL DAILY
Qty: 90 TABLET | Refills: 1 | Status: SHIPPED | OUTPATIENT
Start: 2024-11-06

## 2024-11-06 RX ORDER — LOSARTAN POTASSIUM 100 MG/1
100 TABLET ORAL DAILY
Qty: 90 TABLET | Refills: 1 | Status: SHIPPED | OUTPATIENT
Start: 2024-11-06

## 2024-11-06 NOTE — PROGRESS NOTES
Subjective   Patient ID: Linda Van is a 79 y.o. female who presents for Follow-up (6 month) and New Patient Visit (Was patient of Екатерина ).  HPI    Pt of Екатерина's    Routine follow up    Feels well    # PAF  -Follow with Dr. Stinson  -Continue metoprolol 50 mg twice a day  -Continue Eliquis 5 mg every 12 hours for stroke prevention    # Hypertension, controlled  -Continue amlodipine 5 mg daily, losartan 100 mg daily, metoprolol 50 mg twice a day  -Low fat/cholesterol, low sodium, low carbohydrate diet  -Exercise as tolerated 150 minutes/week, increase activity slowly  -Weight loss    # Hyperlipidemia  -Continue pravastatin 40 mg daily  -Low fat/low cholesterol diet  -Eat more fresh foods  -Avoid processed/prepackaged/fast foods  -Gradually increase physical activity  -Weight loss    # Hypothyroidism, controlled  -Continue levothyroxine 25 mg daily  -Monitor TSH    # Obesity, BMI 32  -Avoid sweets and sugary drinks  -Drink plenty of water  -Avoid processed foods and refined foods  -Eat fruits, vegetables, lean protein, whole grains  -Increase your activity level    # R hip arthritis  -S/P RTHA 11/2023  -Follow-up with Ortho    Review of Systems   All other systems reviewed and are negative.      Objective   /70   Pulse 50   Temp 36.3 °C (97.3 °F)   Wt 84.3 kg (185 lb 12.8 oz)   SpO2 98%   BMI 33.98 kg/m²   Lab Results   Component Value Date    WBC 7.8 05/02/2024    HGB 13.4 05/02/2024    HCT 41.1 05/02/2024     05/02/2024    CHOL 188 05/02/2024    TRIG 158 (H) 05/02/2024    HDL 74.8 05/02/2024    ALT 12 05/02/2024    AST 18 05/02/2024     05/02/2024    K 4.0 05/02/2024     05/02/2024    CREATININE 0.77 05/02/2024    BUN 12 05/02/2024    CO2 28 05/02/2024    TSH 1.97 05/02/2024    INR 1.5 (H) 07/15/2023    HGBA1C 5.4 07/05/2022           Physical Exam  Vitals reviewed.   Constitutional:       Appearance: Normal appearance. She is obese.   HENT:      Head: Normocephalic and atraumatic.       Mouth/Throat:      Pharynx: No posterior oropharyngeal erythema.   Eyes:      General: No scleral icterus.     Conjunctiva/sclera: Conjunctivae normal.      Pupils: Pupils are equal, round, and reactive to light.   Cardiovascular:      Rate and Rhythm: Normal rate and regular rhythm.      Heart sounds: Normal heart sounds.   Pulmonary:      Effort: No respiratory distress.      Breath sounds: No wheezing.   Abdominal:      General: Abdomen is flat. Bowel sounds are normal. There is no distension.      Palpations: Abdomen is soft. There is no mass.      Tenderness: There is no abdominal tenderness. There is no rebound.   Musculoskeletal:         General: Normal range of motion.      Cervical back: Normal range of motion and neck supple.      Comments: cane   Skin:     General: Skin is warm and dry.   Neurological:      General: No focal deficit present.      Mental Status: She is alert and oriented to person, place, and time. Mental status is at baseline.   Psychiatric:         Mood and Affect: Mood normal.         Behavior: Behavior normal.         Thought Content: Thought content normal.         Judgment: Judgment normal.         Problem List Items Addressed This Visit             ICD-10-CM    Hyperlipidemia E78.5    Relevant Medications    pravastatin (Pravachol) 40 mg tablet    Benign essential hypertension - Primary I10    Relevant Medications    amLODIPine (Norvasc) 5 mg tablet    losartan (Cozaar) 100 mg tablet    Hypothyroidism E03.9    Relevant Medications    levothyroxine (Synthroid, Levoxyl) 25 mcg tablet    Arthritis M19.90     Other Visit Diagnoses         Codes    Paroxysmal atrial fibrillation (Multi)     I48.0    Relevant Medications    amLODIPine (Norvasc) 5 mg tablet    Class 1 obesity due to excess calories with serious comorbidity and body mass index (BMI) of 33.0 to 33.9 in adult     E66.811, E66.09, Z68.33          Assessment/Plan     Pt of Екатерина's    Routine follow up    Feels well    #  PAF  -Follow with Dr. Stinson  -Continue metoprolol 50 mg twice a day  -Continue Eliquis 5 mg every 12 hours for stroke prevention    # Hypertension, controlled  -Continue amlodipine 5 mg daily, losartan 100 mg daily, metoprolol 50 mg twice a day  -Low fat/cholesterol, low sodium, low carbohydrate diet  -Exercise as tolerated 150 minutes/week, increase activity slowly  -Weight loss    # Hyperlipidemia  -Continue pravastatin 40 mg daily  -Low fat/low cholesterol diet  -Eat more fresh foods  -Avoid processed/prepackaged/fast foods  -Gradually increase physical activity  -Weight loss    # Hypothyroidism, controlled  -Continue levothyroxine 25 mg daily  -Monitor TSH    # Obesity, BMI 32  -Avoid sweets and sugary drinks  -Drink plenty of water  -Avoid processed foods and refined foods  -Eat fruits, vegetables, lean protein, whole grains  -Increase your activity level    # R hip arthritis  -S/P RTHA 11/2023  -Follow-up with Ortho    #immunizations  RSV, COVID, pneumonia recommended    Follow up 6 months / medicare physical

## 2025-05-05 ENCOUNTER — APPOINTMENT (OUTPATIENT)
Dept: PRIMARY CARE | Facility: CLINIC | Age: 80
End: 2025-05-05
Payer: MEDICARE

## 2025-05-05 VITALS
TEMPERATURE: 97.4 F | OXYGEN SATURATION: 97 % | WEIGHT: 185.6 LBS | HEIGHT: 62 IN | HEART RATE: 73 BPM | SYSTOLIC BLOOD PRESSURE: 156 MMHG | BODY MASS INDEX: 34.16 KG/M2 | DIASTOLIC BLOOD PRESSURE: 70 MMHG

## 2025-05-05 DIAGNOSIS — E55.9 VITAMIN D DEFICIENCY: ICD-10-CM

## 2025-05-05 DIAGNOSIS — I48.0 PAROXYSMAL ATRIAL FIBRILLATION (MULTI): ICD-10-CM

## 2025-05-05 DIAGNOSIS — E78.00 HYPERCHOLESTEREMIA: ICD-10-CM

## 2025-05-05 DIAGNOSIS — E66.811 CLASS 1 OBESITY DUE TO EXCESS CALORIES WITH SERIOUS COMORBIDITY AND BODY MASS INDEX (BMI) OF 33.0 TO 33.9 IN ADULT: ICD-10-CM

## 2025-05-05 DIAGNOSIS — E03.9 HYPOTHYROIDISM, UNSPECIFIED TYPE: ICD-10-CM

## 2025-05-05 DIAGNOSIS — E66.09 CLASS 1 OBESITY DUE TO EXCESS CALORIES WITH SERIOUS COMORBIDITY AND BODY MASS INDEX (BMI) OF 33.0 TO 33.9 IN ADULT: ICD-10-CM

## 2025-05-05 DIAGNOSIS — E53.8 VITAMIN B12 DEFICIENCY: ICD-10-CM

## 2025-05-05 DIAGNOSIS — Z00.00 ENCOUNTER FOR SUBSEQUENT ANNUAL WELLNESS VISIT (AWV) IN MEDICARE PATIENT: Primary | ICD-10-CM

## 2025-05-05 DIAGNOSIS — I10 BENIGN ESSENTIAL HYPERTENSION: ICD-10-CM

## 2025-05-05 PROCEDURE — 1160F RVW MEDS BY RX/DR IN RCRD: CPT | Performed by: INTERNAL MEDICINE

## 2025-05-05 PROCEDURE — 1123F ACP DISCUSS/DSCN MKR DOCD: CPT | Performed by: INTERNAL MEDICINE

## 2025-05-05 PROCEDURE — G0439 PPPS, SUBSEQ VISIT: HCPCS | Performed by: INTERNAL MEDICINE

## 2025-05-05 PROCEDURE — 1159F MED LIST DOCD IN RCRD: CPT | Performed by: INTERNAL MEDICINE

## 2025-05-05 PROCEDURE — 3077F SYST BP >= 140 MM HG: CPT | Performed by: INTERNAL MEDICINE

## 2025-05-05 PROCEDURE — 1036F TOBACCO NON-USER: CPT | Performed by: INTERNAL MEDICINE

## 2025-05-05 PROCEDURE — 3078F DIAST BP <80 MM HG: CPT | Performed by: INTERNAL MEDICINE

## 2025-05-05 PROCEDURE — 1170F FXNL STATUS ASSESSED: CPT | Performed by: INTERNAL MEDICINE

## 2025-05-05 PROCEDURE — 1157F ADVNC CARE PLAN IN RCRD: CPT | Performed by: INTERNAL MEDICINE

## 2025-05-05 PROCEDURE — G2211 COMPLEX E/M VISIT ADD ON: HCPCS | Performed by: INTERNAL MEDICINE

## 2025-05-05 PROCEDURE — 1158F ADVNC CARE PLAN TLK DOCD: CPT | Performed by: INTERNAL MEDICINE

## 2025-05-05 PROCEDURE — 99214 OFFICE O/P EST MOD 30 MIN: CPT | Performed by: INTERNAL MEDICINE

## 2025-05-05 ASSESSMENT — ACTIVITIES OF DAILY LIVING (ADL)
GROCERY_SHOPPING: INDEPENDENT
MANAGING_FINANCES: INDEPENDENT
TAKING_MEDICATION: INDEPENDENT
DRESSING: INDEPENDENT
DOING_HOUSEWORK: INDEPENDENT
BATHING: INDEPENDENT

## 2025-05-05 ASSESSMENT — PATIENT HEALTH QUESTIONNAIRE - PHQ9
2. FEELING DOWN, DEPRESSED OR HOPELESS: NOT AT ALL
1. LITTLE INTEREST OR PLEASURE IN DOING THINGS: NOT AT ALL
SUM OF ALL RESPONSES TO PHQ9 QUESTIONS 1 AND 2: 0

## 2025-05-05 ASSESSMENT — ENCOUNTER SYMPTOMS
OCCASIONAL FEELINGS OF UNSTEADINESS: 1
DEPRESSION: 0
LOSS OF SENSATION IN FEET: 0

## 2025-05-05 NOTE — PROGRESS NOTES
"Subjective   Reason for Visit: Linda Van is an 80 y.o. female here for a Medicare Wellness visit and follow up    Past Medical, Surgical, and Family History reviewed and updated in chart.    Reviewed all medications by prescribing practitioner or clinical pharmacist (such as prescriptions, OTCs, herbal therapies and supplements) and documented in the medical record.    HPI    Medicare wellness    Follow up    Pt of [place]     Feels well     # PAF  -Follow with Dr. Stinson  -Continue metoprolol 50 mg twice a day  -Continue Eliquis 5 mg every 12 hours for stroke prevention     # Hypertension, controlled  -Continue amlodipine 5 mg daily, losartan 100 mg daily, metoprolol 50 mg twice a day  -Low fat/cholesterol, low sodium, low carbohydrate diet  -Exercise as tolerated 150 minutes/week, increase activity slowly  -Weight loss     # Hyperlipidemia  -Continue pravastatin 40 mg daily  -Low fat/low cholesterol diet  -Eat more fresh foods  -Avoid processed/prepackaged/fast foods  -Gradually increase physical activity  -Weight loss     # Hypothyroidism, controlled  -Continue levothyroxine 25 mg daily  -Monitor TSH     # Obesity, BMI 33.9  -Avoid sweets and sugary drinks  -Drink plenty of water  -Avoid processed foods and refined foods  -Eat fruits, vegetables, lean protein, whole grains  -Increase your activity level     # R hip arthritis  -S/P RTHA 11/2023  -Follow-up with Ortho     #immunizations  RSV recommended    Patient Care Team:  Sobia Boyd MD as PCP - General (Internal Medicine)  BRENDON Wagner-CNP, SANDY as PCP [facility]     Review of Systems   All other systems reviewed and are negative.      Objective   Vitals:  /70   Pulse 73   Temp 36.3 °C (97.4 °F)   Ht 1.575 m (5' 2\")   Wt 84.2 kg (185 lb 9.6 oz)   SpO2 97%   BMI 33.95 kg/m²       Physical Exam  Vitals reviewed.   Constitutional:       Appearance: Normal appearance. She is obese.   HENT:      Head: " Normocephalic and atraumatic.      Mouth/Throat:      Pharynx: No posterior oropharyngeal erythema.   Eyes:      General: No scleral icterus.     Conjunctiva/sclera: Conjunctivae normal.      Pupils: Pupils are equal, round, and reactive to light.   Cardiovascular:      Rate and Rhythm: Normal rate and regular rhythm.      Heart sounds: Normal heart sounds.   Pulmonary:      Effort: No respiratory distress.      Breath sounds: No wheezing.   Abdominal:      General: Abdomen is flat. Bowel sounds are normal. There is no distension.      Palpations: Abdomen is soft. There is no mass.      Tenderness: There is no abdominal tenderness. There is no rebound.   Musculoskeletal:         General: Normal range of motion.      Cervical back: Normal range of motion and neck supple.      Comments: cane   Skin:     General: Skin is warm and dry.   Neurological:      General: No focal deficit present.      Mental Status: She is alert and oriented to person, place, and time. Mental status is at baseline.   Psychiatric:         Mood and Affect: Mood normal.         Behavior: Behavior normal.         Thought Content: Thought content normal.         Judgment: Judgment normal.         Assessment & Plan  Encounter for subsequent annual wellness visit (AWV) in Medicare patient         Paroxysmal atrial fibrillation (Multi)    Orders:    CBC and Auto Differential; Future    Hypercholesteremia    Orders:    Comprehensive Metabolic Panel; Future    Lipid Panel; Future    TSH with reflex to Free T4 if abnormal; Future    Benign essential hypertension         Hypothyroidism, unspecified type         Vitamin B12 deficiency    Orders:    Vitamin B12; Future    Vitamin D deficiency    Orders:    Vitamin D 25-Hydroxy,Total (for eval of Vitamin D levels); Future    Class 1 obesity due to excess calories with serious comorbidity and body mass index (BMI) of 33.0 to 33.9 in adult                     Medicare wellness    Follow up    [place]      Feels well     # PAF  -Follow with Dr. Stinson  -Continue metoprolol 50 mg twice a day  -Continue Eliquis 5 mg every 12 hours for stroke prevention     # Hypertension, controlled  -Continue amlodipine 5 mg daily, losartan 100 mg daily, metoprolol 50 mg twice a day  -Low fat/cholesterol, low sodium, low carbohydrate diet  -Exercise as tolerated 150 minutes/week, increase activity slowly  -Weight loss     # Hyperlipidemia  -Continue pravastatin 40 mg daily  -Low fat/low cholesterol diet  -Eat more fresh foods  -Avoid processed/prepackaged/fast foods  -Gradually increase physical activity  -Weight loss     # Hypothyroidism, controlled  -Continue levothyroxine 25 mg daily  -Monitor TSH     # Obesity, BMI 33.9  -Avoid sweets and sugary drinks  -Drink plenty of water  -Avoid processed foods and refined foods  -Eat fruits, vegetables, lean protein, whole grains  -Increase your activity level     # R hip arthritis  -S/P RTHA 11/2023  -Follow-up with Ortho     #immunizations  RSV recommended    Mammogram  pt declined  Dexa 6-19   osteopenia  Colonoscopy  n/a  CT chest lung cancer screening n/a  GYN n/a  immunizations rev'd  RSV  BMI 33.9    Check labs    Follow up 6 months

## 2025-05-06 LAB
25(OH)D3+25(OH)D2 SERPL-MCNC: 52 NG/ML (ref 30–100)
ALBUMIN SERPL-MCNC: 4.3 G/DL (ref 3.6–5.1)
ALP SERPL-CCNC: 68 U/L (ref 37–153)
ALT SERPL-CCNC: 12 U/L (ref 6–29)
ANION GAP SERPL CALCULATED.4IONS-SCNC: 7 MMOL/L (CALC) (ref 7–17)
AST SERPL-CCNC: 19 U/L (ref 10–35)
BASOPHILS # BLD AUTO: 94 CELLS/UL (ref 0–200)
BASOPHILS NFR BLD AUTO: 1.3 %
BILIRUB SERPL-MCNC: 0.7 MG/DL (ref 0.2–1.2)
BUN SERPL-MCNC: 15 MG/DL (ref 7–25)
CALCIUM SERPL-MCNC: 9.7 MG/DL (ref 8.6–10.4)
CHLORIDE SERPL-SCNC: 107 MMOL/L (ref 98–110)
CHOLEST SERPL-MCNC: 183 MG/DL
CHOLEST/HDLC SERPL: 2.3 (CALC)
CO2 SERPL-SCNC: 28 MMOL/L (ref 20–32)
CREAT SERPL-MCNC: 0.82 MG/DL (ref 0.6–0.95)
EGFRCR SERPLBLD CKD-EPI 2021: 72 ML/MIN/1.73M2
EOSINOPHIL # BLD AUTO: 137 CELLS/UL (ref 15–500)
EOSINOPHIL NFR BLD AUTO: 1.9 %
ERYTHROCYTE [DISTWIDTH] IN BLOOD BY AUTOMATED COUNT: 12.9 % (ref 11–15)
GLUCOSE SERPL-MCNC: 110 MG/DL (ref 65–99)
HCT VFR BLD AUTO: 44.4 % (ref 35–45)
HDLC SERPL-MCNC: 78 MG/DL
HGB BLD-MCNC: 14.3 G/DL (ref 11.7–15.5)
LDLC SERPL CALC-MCNC: 83 MG/DL (CALC)
LYMPHOCYTES # BLD AUTO: 2124 CELLS/UL (ref 850–3900)
LYMPHOCYTES NFR BLD AUTO: 29.5 %
MCH RBC QN AUTO: 29.9 PG (ref 27–33)
MCHC RBC AUTO-ENTMCNC: 32.2 G/DL (ref 32–36)
MCV RBC AUTO: 92.9 FL (ref 80–100)
MONOCYTES # BLD AUTO: 511 CELLS/UL (ref 200–950)
MONOCYTES NFR BLD AUTO: 7.1 %
NEUTROPHILS # BLD AUTO: 4334 CELLS/UL (ref 1500–7800)
NEUTROPHILS NFR BLD AUTO: 60.2 %
NONHDLC SERPL-MCNC: 105 MG/DL (CALC)
PLATELET # BLD AUTO: 252 THOUSAND/UL (ref 140–400)
PMV BLD REES-ECKER: 11.6 FL (ref 7.5–12.5)
POTASSIUM SERPL-SCNC: 4.4 MMOL/L (ref 3.5–5.3)
PROT SERPL-MCNC: 6.8 G/DL (ref 6.1–8.1)
RBC # BLD AUTO: 4.78 MILLION/UL (ref 3.8–5.1)
SODIUM SERPL-SCNC: 142 MMOL/L (ref 135–146)
TRIGL SERPL-MCNC: 120 MG/DL
TSH SERPL-ACNC: 1.51 MIU/L (ref 0.4–4.5)
VIT B12 SERPL-MCNC: 286 PG/ML (ref 200–1100)
WBC # BLD AUTO: 7.2 THOUSAND/UL (ref 3.8–10.8)

## 2025-05-07 NOTE — RESULT ENCOUNTER NOTE
Please call the patient regarding her abnormal result.  Change to vitamin B12 5000 units sublingual weekly  Low sugar diet  Follow as scheduled

## 2025-06-04 DIAGNOSIS — E03.9 HYPOTHYROIDISM, UNSPECIFIED TYPE: ICD-10-CM

## 2025-06-04 DIAGNOSIS — E78.5 HYPERLIPIDEMIA, UNSPECIFIED HYPERLIPIDEMIA TYPE: ICD-10-CM

## 2025-06-04 DIAGNOSIS — I10 BENIGN ESSENTIAL HYPERTENSION: ICD-10-CM

## 2025-06-04 RX ORDER — LEVOTHYROXINE SODIUM 25 UG/1
25 TABLET ORAL
Qty: 90 TABLET | Refills: 1 | Status: SHIPPED | OUTPATIENT
Start: 2025-06-04

## 2025-06-04 RX ORDER — AMLODIPINE BESYLATE 5 MG/1
5 TABLET ORAL DAILY
Qty: 90 TABLET | Refills: 1 | Status: SHIPPED | OUTPATIENT
Start: 2025-06-04

## 2025-06-04 RX ORDER — PRAVASTATIN SODIUM 40 MG/1
40 TABLET ORAL DAILY
Qty: 90 TABLET | Refills: 1 | Status: SHIPPED | OUTPATIENT
Start: 2025-06-04

## 2025-06-04 RX ORDER — LOSARTAN POTASSIUM 100 MG/1
100 TABLET ORAL DAILY
Qty: 90 TABLET | Refills: 1 | Status: SHIPPED | OUTPATIENT
Start: 2025-06-04

## 2025-11-05 ENCOUNTER — APPOINTMENT (OUTPATIENT)
Dept: PRIMARY CARE | Facility: CLINIC | Age: 80
End: 2025-11-05
Payer: MEDICARE